# Patient Record
Sex: MALE | Race: WHITE | NOT HISPANIC OR LATINO | URBAN - METROPOLITAN AREA
[De-identification: names, ages, dates, MRNs, and addresses within clinical notes are randomized per-mention and may not be internally consistent; named-entity substitution may affect disease eponyms.]

---

## 2024-11-11 ENCOUNTER — HOSPITAL ENCOUNTER (OUTPATIENT)
Dept: CARDIOLOGY | Facility: HOSPITAL | Age: 64
Discharge: HOME | End: 2024-11-11
Payer: COMMERCIAL

## 2024-11-19 ENCOUNTER — HOSPITAL ENCOUNTER (OUTPATIENT)
Dept: CARDIOLOGY | Facility: HOSPITAL | Age: 64
Discharge: HOME | End: 2024-11-19
Payer: COMMERCIAL

## 2024-12-02 DIAGNOSIS — I34.0 MITRAL VALVE INSUFFICIENCY, UNSPECIFIED ETIOLOGY: Primary | ICD-10-CM

## 2024-12-19 PROBLEM — I10 HYPERTENSION: Status: ACTIVE | Noted: 2024-12-19

## 2024-12-19 PROBLEM — N20.0 KIDNEY STONE: Status: RESOLVED | Noted: 2023-10-17 | Resolved: 2024-12-19

## 2024-12-19 RX ORDER — LISINOPRIL 20 MG/1
20 TABLET ORAL DAILY
COMMUNITY
Start: 2024-10-09

## 2024-12-19 RX ORDER — ONDANSETRON 4 MG/1
4 TABLET, ORALLY DISINTEGRATING ORAL EVERY 8 HOURS PRN
COMMUNITY
Start: 2024-03-25 | End: 2024-12-20 | Stop reason: ALTCHOICE

## 2024-12-19 RX ORDER — PREGABALIN 100 MG/1
1 CAPSULE ORAL
COMMUNITY
Start: 2024-05-24 | End: 2024-12-20 | Stop reason: ALTCHOICE

## 2024-12-19 NOTE — PROGRESS NOTES
Referral from Dr. Dang. Rigo comes to discuss treatment options for mitral regurgitation. History of htn, kidney stones. Lindsay Jean RN    Patient comes to clinic to discuss treatment options for mitral regurgitation.  Echocardiography demonstrates severe mitral regurgitation secondary to flail of segment of posterior mitral valve leaflet.  Coronary angiography demonstrates normal coronary arteries without any significant atherosclerosis.  Recommend mini right thoracotomy and repair of mitral valve.  Will plan on right common femoral artery and vein cannulation as well as intercostal cryoanalgesia.  If unable to repair valve we will plan on using a tissue valve.  Risks, benefits, and alternatives discussed with patient, who wishes to proceed.  I have reviewed his echocardiogram and coronary angiogram.

## 2024-12-20 ENCOUNTER — OFFICE VISIT (OUTPATIENT)
Dept: CARDIAC SURGERY | Facility: HOSPITAL | Age: 64
End: 2024-12-20
Payer: COMMERCIAL

## 2024-12-20 VITALS
HEIGHT: 68 IN | DIASTOLIC BLOOD PRESSURE: 92 MMHG | HEART RATE: 79 BPM | SYSTOLIC BLOOD PRESSURE: 160 MMHG | WEIGHT: 212.6 LBS | OXYGEN SATURATION: 95 % | BODY MASS INDEX: 32.22 KG/M2

## 2024-12-20 DIAGNOSIS — I34.0 MITRAL VALVE INSUFFICIENCY, UNSPECIFIED ETIOLOGY: ICD-10-CM

## 2024-12-20 PROCEDURE — 3077F SYST BP >= 140 MM HG: CPT | Performed by: THORACIC SURGERY (CARDIOTHORACIC VASCULAR SURGERY)

## 2024-12-20 PROCEDURE — 99205 OFFICE O/P NEW HI 60 MIN: CPT | Performed by: THORACIC SURGERY (CARDIOTHORACIC VASCULAR SURGERY)

## 2024-12-20 PROCEDURE — 3080F DIAST BP >= 90 MM HG: CPT | Performed by: THORACIC SURGERY (CARDIOTHORACIC VASCULAR SURGERY)

## 2024-12-20 PROCEDURE — 99215 OFFICE O/P EST HI 40 MIN: CPT | Performed by: THORACIC SURGERY (CARDIOTHORACIC VASCULAR SURGERY)

## 2024-12-20 PROCEDURE — 1036F TOBACCO NON-USER: CPT | Performed by: THORACIC SURGERY (CARDIOTHORACIC VASCULAR SURGERY)

## 2024-12-20 PROCEDURE — 3008F BODY MASS INDEX DOCD: CPT | Performed by: THORACIC SURGERY (CARDIOTHORACIC VASCULAR SURGERY)

## 2024-12-20 RX ORDER — IBUPROFEN 100 MG/5ML
1000 SUSPENSION, ORAL (FINAL DOSE FORM) ORAL DAILY
COMMUNITY

## 2024-12-20 ASSESSMENT — PAIN SCALES - GENERAL: PAINLEVEL_OUTOF10: 0-NO PAIN

## 2025-01-09 DIAGNOSIS — I34.0 MITRAL VALVE INSUFFICIENCY, UNSPECIFIED ETIOLOGY: ICD-10-CM

## 2025-01-28 ENCOUNTER — CLINICAL SUPPORT (OUTPATIENT)
Dept: PREADMISSION TESTING | Facility: HOSPITAL | Age: 65
End: 2025-01-28
Payer: COMMERCIAL

## 2025-01-28 ASSESSMENT — ENCOUNTER SYMPTOMS
NEUROLOGICAL NEGATIVE: 1
CONSTITUTIONAL NEGATIVE: 1
CARDIOVASCULAR NEGATIVE: 1
COUGH: 1
GASTROINTESTINAL NEGATIVE: 1
NECK NEGATIVE: 1
MUSCULOSKELETAL NEGATIVE: 1

## 2025-01-28 NOTE — CPM/PAT H&P
CPM/PAT Evaluation       Name: Rigo Monet (Rigo Monet)  /Age: 1960/64 y.o.     { PAT Visit Type:35256}      Chief Complaint: ***    HPI  Rigo Monet is scheduled for repair, mitral valve- right on 25 with Dr. Valdes.   Past Medical History:   Diagnosis Date    Hypertension     Kidney stone 10/17/2023    Nephrolithiasis     Severe mitral regurgitation     Viral URI 2025       No past surgical history on file.    Patient  has no history on file for sexual activity.    No family history on file.    No Known Allergies    Prior to Admission medications    Medication Sig Start Date End Date Taking? Authorizing Provider   ascorbic acid (Vitamin C) 1,000 mg tablet Take 1 tablet (1,000 mg) by mouth once daily.    Historical Provider, MD   lisinopril 20 mg tablet Take 1 tablet (20 mg) by mouth once daily. 10/9/24   Historical Provider, MD JOHNSON ROS:   Constitutional:   neg    Neuro/Psych:   neg    Eyes:    use of corrective lenses  Ears:   neg    Nose:   neg    Mouth:   neg    Throat:   neg    Neck:   neg    Cardio:   neg    Respiratory:    cough (dry , np)  Endocrine:   GI:   neg    :   neg    Musculoskeletal:   neg    Hematologic:   neg    Skin:  neg        PAT Physical Exam     Airway    Testing/Diagnostic:   Cardiac Cath: 10/30/24      Transesophageal Echo; 10/15/24    ECG; 10/30/24- Media tab  Sinus Rhythm    Patient Specialist/PCP:   PCP: Nehemiah Dukes MD P: [phone]    Cardiac Surgery: Denilson Valdes MD LOV 24 Referral from Dr. Dang. Rigo comes to discuss treatment options for mitral regurgitation. Recommend mini right thoracotomy and repair of mitral valve. Will plan on right common femoral artery and vein cannulation as well as intercostal cryoanalgesia. If unable to repair valve we will plan on using a tissue valve.     Cardiology: MD Kenyetta Smith 10/9/24 NPV for abnormal echo with concern for significant murmur.    Urology: Tae Rodgers MD seen for kidney  stones  Visit Vitals  Smoking Status Never       DASI Risk Score      Flowsheet Row Questionnaire Series Submission from 1/3/2025 in [facility] with Generic Provider Keira   Can you take care of yourself (eat, dress, bathe, or use toilet)?  2.75  filed at 01/03/2025 1654   Can you walk indoors, such as around your house? 1.75  filed at 01/03/2025 1654   Can you walk a block or two on level ground?  2.75  filed at 01/03/2025 1654   Can you climb a flight of stairs or walk up a hill? 5.5  filed at 01/03/2025 1654   Can you run a short distance? 8  filed at 01/03/2025 1654   Can you do light work around the house like dusting or washing dishes? 2.7  filed at 01/03/2025 1654   Can you do moderate work around the house like vacuuming, sweeping floors or carrying groceries? 3.5  filed at 01/03/2025 1654   Can you do heavy work around the house like scrubbing floors or lifting and moving heavy furniture?  8  filed at 01/03/2025 1654   Can you do yard work like raking leaves, weeding or pushing a mower? 4.5  filed at 01/03/2025 1654   Can you have sexual relations? 5.25  filed at 01/03/2025 1654   Can you participate in moderate recreational activities like golf, bowling, dancing, doubles tennis or throwing a baseball or football? 6  filed at 01/03/2025 1654   Can you participate in strenous sports like swimming, singles tennis, football, basketball, or skiing? 7.5  filed at 01/03/2025 1654   DASI SCORE 58.2  filed at 01/03/2025 1654   METS Score (Will be calculated only when all the questions are answered) 9.9  filed at 01/03/2025 1654          Caprini DVT Assessment    No data to display       Modified Frailty Index    No data to display       CHADS2 Stroke Risk  Current as of yesterday        N/A 3 to 100%: High Risk   2 to < 3%: Medium Risk   0 to < 2%: Low Risk     Last Change: N/A          This score determines the patient's risk of having a stroke if the patient has atrial fibrillation.        This score is not  applicable to this patient. Components are not calculated.          Revised Cardiac Risk Index    No data to display       Apfel Simplified Score    No data to display       Risk Analysis Index Results This Encounter    No data found in the last 10 encounters.       Stop Bang Score      Flowsheet Row Questionnaire Series Submission from 1/3/2025 in [facility] with Generic Provider Keira   Do you snore loudly? 0  filed at 01/03/2025 1654   Do you often feel tired or fatigued after your sleep? 0  filed at 01/03/2025 1654   Has anyone ever observed you stop breathing in your sleep? 0  filed at 01/03/2025 1654   Do you have or are you being treated for high blood pressure? 1  filed at 01/03/2025 1654   Recent BMI (Calculated) 32.3  filed at 01/03/2025 1654   Is BMI greater than 35 kg/m2? 0=No  filed at 01/03/2025 1654   Age older than 50 years old? 1=Yes  filed at 01/03/2025 1654   Gender - Male 1=Yes  filed at 01/03/2025 1654          Prodigy: High Risk  Total Score: 16              Prodigy Age Score      Prodigy Gender Score          ARISCAT Score for Postoperative Pulmonary Complications    No data to display       Lopez Perioperative Risk for Myocardial Infarction or Cardiac Arrest (BRADEN)    No data to display         Assessment and Plan:   Telephone call completed with patient. Medication Instructions: Instructed to hold vitamins, supplements, and NSAIDs 7 days prior to surgery.  Deb Gramajo RN  Pre Admission Testing     Deb Gramajo RN  Pre-Admission Testing   {[facility] EMBEDDED ASSESSMENT AND PLAN:[id number]}

## 2025-02-03 ENCOUNTER — HOSPITAL ENCOUNTER (OUTPATIENT)
Dept: RADIOLOGY | Facility: HOSPITAL | Age: 65
Discharge: HOME | End: 2025-02-03
Payer: COMMERCIAL

## 2025-02-03 ENCOUNTER — PRE-ADMISSION TESTING (OUTPATIENT)
Dept: PREADMISSION TESTING | Facility: HOSPITAL | Age: 65
End: 2025-02-03
Payer: COMMERCIAL

## 2025-02-03 VITALS
HEIGHT: 68 IN | BODY MASS INDEX: 32.12 KG/M2 | SYSTOLIC BLOOD PRESSURE: 170 MMHG | OXYGEN SATURATION: 98 % | TEMPERATURE: 97.9 F | DIASTOLIC BLOOD PRESSURE: 100 MMHG | HEART RATE: 64 BPM | WEIGHT: 211.9 LBS

## 2025-02-03 DIAGNOSIS — I34.0 MITRAL VALVE INSUFFICIENCY, UNSPECIFIED ETIOLOGY: ICD-10-CM

## 2025-02-03 DIAGNOSIS — I34.0 MITRAL VALVE INSUFFICIENCY, UNSPECIFIED ETIOLOGY: Primary | ICD-10-CM

## 2025-02-03 PROCEDURE — 71046 X-RAY EXAM CHEST 2 VIEWS: CPT

## 2025-02-03 PROCEDURE — 85025 COMPLETE CBC W/AUTO DIFF WBC: CPT

## 2025-02-03 PROCEDURE — 86901 BLOOD TYPING SEROLOGIC RH(D): CPT

## 2025-02-03 PROCEDURE — 86923 COMPATIBILITY TEST ELECTRIC: CPT

## 2025-02-03 PROCEDURE — 87081 CULTURE SCREEN ONLY: CPT

## 2025-02-03 PROCEDURE — 36415 COLL VENOUS BLD VENIPUNCTURE: CPT

## 2025-02-03 PROCEDURE — 99204 OFFICE O/P NEW MOD 45 MIN: CPT | Performed by: NURSE PRACTITIONER

## 2025-02-03 PROCEDURE — 84520 ASSAY OF UREA NITROGEN: CPT

## 2025-02-03 RX ORDER — CHLORHEXIDINE GLUCONATE ORAL RINSE 1.2 MG/ML
SOLUTION DENTAL
Qty: 15 ML | Refills: 0 | Status: SHIPPED | OUTPATIENT
Start: 2025-02-03

## 2025-02-03 RX ORDER — CHLORHEXIDINE GLUCONATE 40 MG/ML
SOLUTION TOPICAL
Qty: 473 ML | Refills: 0 | Status: SHIPPED | OUTPATIENT
Start: 2025-02-03

## 2025-02-03 ASSESSMENT — DUKE ACTIVITY SCORE INDEX (DASI)
CAN YOU DO LIGHT WORK AROUND THE HOUSE LIKE DUSTING OR WASHING DISHES: YES
CAN YOU PARTICIPATE IN MODERATE RECREATIONAL ACTIVITIES LIKE GOLF, BOWLING, DANCING, DOUBLES TENNIS OR THROWING A BASEBALL OR FOOTBALL: YES
CAN YOU HAVE SEXUAL RELATIONS: YES
CAN YOU TAKE CARE OF YOURSELF (EAT, DRESS, BATHE, OR USE TOILET): YES
DASI METS SCORE: 9.9
CAN YOU PARTICIPATE IN STRENOUS SPORTS LIKE SWIMMING, SINGLES TENNIS, FOOTBALL, BASKETBALL, OR SKIING: YES
CAN YOU RUN A SHORT DISTANCE: YES
CAN YOU WALK A BLOCK OR TWO ON LEVEL GROUND: YES
CAN YOU DO YARD WORK LIKE RAKING LEAVES, WEEDING OR PUSHING A MOWER: YES
CAN YOU DO MODERATE WORK AROUND THE HOUSE LIKE VACUUMING, SWEEPING FLOORS OR CARRYING GROCERIES: YES
CAN YOU DO HEAVY WORK AROUND THE HOUSE LIKE SCRUBBING FLOORS OR LIFTING AND MOVING HEAVY FURNITURE: YES
CAN YOU CLIMB A FLIGHT OF STAIRS OR WALK UP A HILL: YES
TOTAL_SCORE: 58.2
CAN YOU WALK INDOORS, SUCH AS AROUND YOUR HOUSE: YES

## 2025-02-03 ASSESSMENT — ENCOUNTER SYMPTOMS
PALPITATIONS: 1
COUGH: 1
GASTROINTESTINAL NEGATIVE: 1
NEUROLOGICAL NEGATIVE: 1
EYES NEGATIVE: 1
ENDOCRINE NEGATIVE: 1
NECK NEGATIVE: 1
MUSCULOSKELETAL NEGATIVE: 1
CONSTITUTIONAL NEGATIVE: 1

## 2025-02-03 ASSESSMENT — LIFESTYLE VARIABLES: SMOKING_STATUS: NONSMOKER

## 2025-02-03 NOTE — H&P (VIEW-ONLY)
CPM/PAT Evaluation       Name: Rigo Monet (Rigo Monet)  /Age: 1960/64 y.o.     Visit Type:   In-Person       Chief Complaint: perioperative evaluation      HPI  The patient is a 64 year old male with recent echo demonstrating severe mitral regurgitation secondary to flail of segment of posterior mitral valve leaflet. Coronary angiography demonstrates normal coronary arteries without any significant atherosclerosis. Recommend mini right thoracotomy and repair of mitral valve. She presents today for perioperative evaluation in anticipation of REPAIR, MITRAL VALVE - Right on 25 with Dr. Valdes.    Past Medical History:   Diagnosis Date    Basal cell carcinoma     Hiatal hernia     Hypertension     Kidney stone 10/17/2023    follows with urology    Nephrolithiasis     Severe mitral regurgitation     Viral URI 2025    patient states that he has a dry, NP cough the last 5 weeks       Past Surgical History:   Procedure Laterality Date    CARDIAC CATHETERIZATION  10/30/2024    COLONOSCOPY      KIDNEY STONE SURGERY         Patient Sexual activity questions deferred to the physician.    No family history on file.    No Known Allergies    Prior to Admission medications    Medication Sig Start Date End Date Taking? Authorizing Provider   ascorbic acid (Vitamin C) 1,000 mg tablet Take 1 tablet (1,000 mg) by mouth once daily.    Historical Provider, MD   lisinopril 20 mg tablet Take 1 tablet (20 mg) by mouth once daily. 10/9/24   Historical Provider, MD JOHNSON ROS:   Constitutional:   neg    Neuro/Psych:   neg    Eyes:   neg     use of corrective lenses  Ears:   neg    Nose:   neg    Mouth:   neg    Throat:   neg    Neck:   neg    Cardio:    chest pain (dull, intermittent)   palpitations  Respiratory:    cough (dry, intermittent unchanged)  Endocrine:   neg    GI:   neg    :   neg    Musculoskeletal:   neg    Hematologic:   neg    Skin:  neg        Physical Exam  Vitals reviewed.   Constitutional:   "     Appearance: Normal appearance.   HENT:      Head: Normocephalic and atraumatic.      Nose: Nose normal.      Mouth/Throat:      Mouth: Mucous membranes are moist.      Pharynx: Oropharynx is clear.   Eyes:      Extraocular Movements: Extraocular movements intact.      Pupils: Pupils are equal, round, and reactive to light.   Cardiovascular:      Rate and Rhythm: Normal rate and regular rhythm.      Pulses: Normal pulses.      Heart sounds: Normal heart sounds.   Pulmonary:      Effort: Pulmonary effort is normal.      Breath sounds: Normal breath sounds.   Musculoskeletal:         General: Normal range of motion.      Cervical back: Normal range of motion.   Skin:     General: Skin is warm and dry.   Neurological:      General: No focal deficit present.      Mental Status: He is alert and oriented to person, place, and time.   Psychiatric:         Mood and Affect: Mood normal.         Behavior: Behavior normal.          PAT AIRWAY:   Airway:     Mallampati::  II    TM distance::  >3 FB    Neck ROM::  Full  normal          Visit Vitals  BP (!) 170/100   Pulse 64   Temp 36.6 °C (97.9 °F)   Ht 1.727 m (5' 8\")   Wt 96.1 kg (211 lb 14.4 oz)   SpO2 98%   BMI 32.22 kg/m²   Smoking Status Never   BSA 2.15 m²       DASI Risk Score      Flowsheet Row Pre-Admission Testing from 2/3/2025 in [facility] Questionnaire Series Submission from 1/3/2025 in [facility] with Generic Provider Keira   Can you take care of yourself (eat, dress, bathe, or use toilet)?  2.75 filed at 02/03/2025 1518 2.75  filed at 01/03/2025 1654   Can you walk indoors, such as around your house? 1.75 filed at 02/03/2025 1518 1.75  filed at 01/03/2025 1654   Can you walk a block or two on level ground?  2.75 filed at 02/03/2025 1518 2.75  filed at 01/03/2025 1654   Can you climb a flight of stairs or walk up a hill? 5.5 filed at 02/03/2025 1518 5.5  filed at 01/03/2025 1654   Can you run a short distance? 8 filed at 02/03/2025 1518 8 "  filed at 01/03/2025 1654   Can you do light work around the house like dusting or washing dishes? 2.7 filed at 02/03/2025 1518 2.7  filed at 01/03/2025 1654   Can you do moderate work around the house like vacuuming, sweeping floors or carrying groceries? 3.5 filed at 02/03/2025 1518 3.5  filed at 01/03/2025 1654   Can you do heavy work around the house like scrubbing floors or lifting and moving heavy furniture?  8 filed at 02/03/2025 1518 8  filed at 01/03/2025 1654   Can you do yard work like raking leaves, weeding or pushing a mower? 4.5 filed at 02/03/2025 1518 4.5  filed at 01/03/2025 1654   Can you have sexual relations? 5.25 filed at 02/03/2025 1518 5.25  filed at 01/03/2025 1654   Can you participate in moderate recreational activities like golf, bowling, dancing, doubles tennis or throwing a baseball or football? 6 filed at 02/03/2025 1518 6  filed at 01/03/2025 1654   Can you participate in strenous sports like swimming, singles tennis, football, basketball, or skiing? 7.5 filed at 02/03/2025 1518 7.5  filed at 01/03/2025 1654   DASI SCORE 58.2 filed at 02/03/2025 1518 58.2  filed at 01/03/2025 1654   METS Score (Will be calculated only when all the questions are answered) 9.9 filed at 02/03/2025 1518 9.9  filed at 01/03/2025 1654          Caprini DVT Assessment      Flowsheet Row Pre-Admission Testing from 2/3/2025 in [facility]   DVT Score (IF A SCORE IS NOT CALCULATING, MUST SELECT A BMI TO COMPLETE) 10 filed at 02/03/2025 1515   Surgical Factors Major surgery planned, lasting over 3 hours filed at 02/03/2025 1515   BMI (BMI MUST BE CHOSEN) 31-40 (Obesity) filed at 02/03/2025 1515          Modified Frailty Index      Flowsheet Row Pre-Admission Testing from 2/3/2025 in [facility]   Non-independent functional status (problems with dressing, bathing, personal grooming, or cooking) 0 filed at 02/03/2025 1514   History of diabetes mellitus  0 filed at 02/03/2025 1512    History of COPD 0 filed at 02/03/2025 1514   History of CHF No filed at 02/03/2025 1514   History of MI 0 filed at 02/03/2025 1514   History of Percutaneous Coronary Intervention, Cardiac Surgery, or Angina No filed at 02/03/2025 1514   Hypertension requiring the use of medication  0.0909 filed at 02/03/2025 1514   Peripheral vascular disease 0 filed at 02/03/2025 1514   Impaired sensorium (cognitive impairement or loss, clouding, or delirium) 0 filed at 02/03/2025 1514   TIA or CVA withouy residual deficit 0 filed at 02/03/2025 1514   Cerebrovascular accident with deficit 0 filed at 02/03/2025 1514   Modified Frailty Index Calculator .0909 filed at 02/03/2025 1514          CHADS2 Stroke Risk  Current as of 2 hours ago        N/A 3 to 100%: High Risk   2 to < 3%: Medium Risk   0 to < 2%: Low Risk     Last Change: N/A          This score determines the patient's risk of having a stroke if the patient has atrial fibrillation.        This score is not applicable to this patient. Components are not calculated.          Revised Cardiac Risk Index      Flowsheet Row Pre-Admission Testing from 2/3/2025 in [facility]   High-Risk Surgery (Intraperitoneal, Intrathoracic,Suprainguinal vascular) 1 filed at 02/03/2025 1514   History of ischemic heart disease (History of MI, History of positive exercuse test, Current chest paint considered due to myocardial ischemia, Use of nitrate therapy, ECG with pathological Q Waves) 0 filed at 02/03/2025 1514   History of congestive heart failure (pulmonary edemia, bilateral rales or S3 gallop, Paroxysmal nocturnal dyspnea, CXR showing pulmonary vascular redistribution) 0 filed at 02/03/2025 1514   History of cerebrovascular disease (Prior TIA or stroke) 0 filed at 02/03/2025 1514   Pre-operative insulin treatment 0 filed at 02/03/2025 1514   Pre-operative creatinine>2 mg/dl 0 filed at 02/03/2025 1514   Revised Cardiac Risk Calculator 1 filed at 02/03/2025 1514           Apfel Simplified Score      Flowsheet Row Pre-Admission Testing from 2/3/2025 in [facility]   Smoking status 1 filed at 02/03/2025 1515   History of motion sickness or PONV  0 filed at 02/03/2025 1515   Use of postoperative opioids 1 filed at 02/03/2025 1515   Gender - Female 0=No filed at 02/03/2025 1515   Apfel Simplified Score Calculator 2 filed at 02/03/2025 1515          Risk Analysis Index Results This Encounter    No data found in the last 10 encounters.       Stop Bang Score      Flowsheet Row Pre-Admission Testing from 2/3/2025 in [facility] Questionnaire Series Submission from 1/3/2025 in [facility] with Generic Provider Keira   Do you snore loudly? 1 filed at 02/03/2025 1518 0  filed at 01/03/2025 1654   Do you often feel tired or fatigued after your sleep? 1 filed at 02/03/2025 1518 0  filed at 01/03/2025 1654   Has anyone ever observed you stop breathing in your sleep? 0 filed at 02/03/2025 1518 0  filed at 01/03/2025 1654   Do you have or are you being treated for high blood pressure? 1 filed at 02/03/2025 1518 1  filed at 01/03/2025 1654   Recent BMI (Calculated) 32.3 filed at 02/03/2025 1518 32.3  filed at 01/03/2025 1654   Is BMI greater than 35 kg/m2? 0=No filed at 02/03/2025 1518 0=No  filed at 01/03/2025 1654   Age older than 50 years old? 1=Yes filed at 02/03/2025 1518 1=Yes  filed at 01/03/2025 1654   Is your neck circumference greater than 17 inches (Male) or 16 inches (Female)? 0 filed at 02/03/2025 1518 --   Gender - Male 1=Yes filed at 02/03/2025 1518 1=Yes  filed at 01/03/2025 1654   STOP-BANG Total Score 5 filed at 02/03/2025 1518 --          Prodigy: High Risk  Total Score: 16              Prodigy Age Score      Prodigy Gender Score          ARISCAT Score for Postoperative Pulmonary Complications    No data to display       Lopez Perioperative Risk for Myocardial Infarction or Cardiac Arrest (BRADEN)    No data to display       Recent Results (from  the past 4 weeks)   Comprehensive Metabolic Panel    Collection Time: 02/03/25  3:38 PM   Result Value Ref Range    Glucose      Sodium 139 136 - 145 mmol/L    Potassium      Chloride 103 98 - 107 mmol/L    Bicarbonate 26 21 - 32 mmol/L    Anion Gap      Urea Nitrogen 14 6 - 23 mg/dL    Creatinine 0.99 0.50 - 1.30 mg/dL    eGFR 85 >60 mL/min/1.73m*2    Calcium 10.0 8.6 - 10.6 mg/dL    Albumin 4.3 3.4 - 5.0 g/dL    Alkaline Phosphatase 77 33 - 136 U/L    Total Protein 7.8 6.4 - 8.2 g/dL    AST 14 9 - 39 U/L    Bilirubin, Total 0.8 0.0 - 1.2 mg/dL    ALT 10 10 - 52 U/L   CBC and Auto Differential    Collection Time: 02/03/25  3:38 PM   Result Value Ref Range    WBC 6.3 4.4 - 11.3 x10*3/uL    nRBC 0.0 0.0 - 0.0 /100 WBCs    RBC 5.05 4.50 - 5.90 x10*6/uL    Hemoglobin 14.8 13.5 - 17.5 g/dL    Hematocrit 46.7 41.0 - 52.0 %    MCV 93 80 - 100 fL    MCH 29.3 26.0 - 34.0 pg    MCHC 31.7 (L) 32.0 - 36.0 g/dL    RDW 13.7 11.5 - 14.5 %    Platelets 174 150 - 450 x10*3/uL    Neutrophils % 55.3 40.0 - 80.0 %    Immature Granulocytes %, Automated 0.3 0.0 - 0.9 %    Lymphocytes % 34.4 13.0 - 44.0 %    Monocytes % 6.8 2.0 - 10.0 %    Eosinophils % 2.9 0.0 - 6.0 %    Basophils % 0.3 0.0 - 2.0 %    Neutrophils Absolute 3.48 1.20 - 7.70 x10*3/uL    Immature Granulocytes Absolute, Automated 0.02 0.00 - 0.70 x10*3/uL    Lymphocytes Absolute 2.17 1.20 - 4.80 x10*3/uL    Monocytes Absolute 0.43 0.10 - 1.00 x10*3/uL    Eosinophils Absolute 0.18 0.00 - 0.70 x10*3/uL    Basophils Absolute 0.02 0.00 - 0.10 x10*3/uL   Type And Screen    Collection Time: 02/03/25  3:38 PM   Result Value Ref Range    ABO TYPE O     Rh TYPE POS     ANTIBODY SCREEN NEG    MRSA pending    Diagnostic Results   CXRAY 2/4/25  IMPRESSION:  No radiographic evidence of acute cardiopulmonary process.    ECG: 10/30/24 see media tab  Sinus rhythm     Cardiac Cath 10/30/24  Summary:  Condition 1-PA wedge pressure=26/25 (20) mm Hg. LV presssure=92/30, 12 mm Hg. dP/ye=1614 mm  Hg/s  Mitral valve: There is severe regurgitation  Left ventricle: Systolic function is normal  Impressions:  No significant coronary artery disease is identified  Severe MR.    Transesophageal echo 10/15/24      Assessment and Plan:     Anesthesia  The patient denies problems with anesthesia in the past such as PONV, prolonged sedation, awareness, dental damage, aspiration, cardiac arrest, difficult intubation, or unexpected hospital admissions.     Neurology  The patient has no neurological diagnoses or significant findings on chart review, clinical presentation, and evaluation. No grossly apparent neurological perioperative risk. The patient is at increased risk for perioperative stroke secondary to cardiac disease, hypertension , general anesthesia, operative time >2.5 hours. Handouts for preoperative brain exercises given to patient.    HEENT/Airway  No diagnoses, significant findings on chart review, clinical presentation, or evaluation. No documented or reported history of airway difficulty.     Cardiovascular  #HTN  -managed on lisinopril, instructed to hold 24 hours prior to surgery. BP today 170/100.    #Mitral regurgitation  -severe by last echo, scheduled for surgery with Dr. Valdes on 2/13/25    METS  The patient's functional capacity is greater than 4 METS.  RCRI  The patient meets 1 RCRI criteria and therefore has a 6% risk of major adverse cardiac complications.  BRADEN score which indicates a 0.2% risk of intraoperative or 30-day postoperative MACE (major adverse cardiac event).    Cardiology Evaluation  The patient follows with cardiology, MD Kenyetta Smith 10/9/24 NPV for abnormal echo with concern for significant murmur.    He is scheduled for cardiac surgery.  Preoperative evaluation is complete pending results of labs, urine, nares.    Pulmonary  No significant findings on chart review or clinical presentation and evaluation. The patient is at increased risk of perioperative pulmonary  complications secondary to thoracic surgery, advanced age greater than 60.    STOP BANG 5, which places patient at high risk for having DOMINICK.    Recommend prioritizing non opioid analgesic techniques (regional and local anesthesia, nonsteroidal medications, etc) before the administration of opioids and close monitoring for hypoventilation after surgery due to DOMINICK/supsected DOMINICK. If intravenous narcotics are needed beyond the immediate indio-operative period, the patient may benefit from continuous pulse oximetry to monitor for hypoxic events till baseline Sp02 is normal on room air and  a respiratory therapy evaluation.    ARISCAT 50, High, 42.1% risk of in-hospital postoperative pulmonary complications  PRODIGY 16, high risk of respiratory depression episode.     Patient given PI sheet for preoperative deep breathing exercises.  Encourage  incentive spirometry in the postoperative period as deemed necessary.    Endocrine  No diagnoses or significant findings on chart review or clinical presentation and evaluation.    Gastrointestinal  The patient has HH, currently asymptomatic. No acute GI issues.    Eat 10- 0,  self-perceived oropharyngeal dysphagia scale (0-40)     Genitourinary  The patient has history of kidney stones s/p lithotripsy, followed by urology Dr. Duc Rodgers, LOV 10/17/23. Follow up prn. Reports no acute  complaints today.    Renal  No renal diagnoses or significant findings on chart review or clinical presentation and evaluation. The patient has specific risk factors associated with increased risk of perioperative renal complications related to age greater than 55, male gender, hypertension. Preventative measures include preoperative hydration.    Hematology  No diagnoses or significant findings on chart review or clinical presentation and evaluation.    Caprini score 10, high risk of perioperative VTE.     Patient instructed to ambulate as soon as possible postoperatively to decrease thromboembolic  risk. Initiate mechanical DVT prophylaxis as soon as possible and initiate chemical prophylaxis when deemed safe from a bleeding standpoint post surgery.     Transfusion Evaluation  A type and screen was obtained given the likelihood for perioperative transfusion of blood or blood products.    Musculoskeletal  No diagnoses or significant findings on chart review or clinical presentation and evaluation.    ID  No diagnoses or significant findings on chart review or clinical presentation and evaluation. MRSA screening obtained. Prescriptions and instructions given for Hibiclens and Peridex.    -Preoperative medication instructions were provided and reviewed with the patient.  Any additional testing or evaluation was explained to the patient.  NPO Instructions were discussed, and the patient's questions were answered prior to conclusion of this encounter. Patient verbalized understanding of preoperative instructions. After Visit Summary given.

## 2025-02-03 NOTE — CPM/PAT H&P
CPM/PAT Evaluation       Name: Rigo Monet (Rigo Monet)  /Age: 1960/64 y.o.     Visit Type:   In-Person       Chief Complaint: perioperative evaluation      HPI  The patient is a 64 year old male with recent echo demonstrating severe mitral regurgitation secondary to flail of segment of posterior mitral valve leaflet. Coronary angiography demonstrates normal coronary arteries without any significant atherosclerosis. Recommend mini right thoracotomy and repair of mitral valve. She presents today for perioperative evaluation in anticipation of REPAIR, MITRAL VALVE - Right on 25 with Dr. Valdes.    Past Medical History:   Diagnosis Date    Basal cell carcinoma     Hiatal hernia     Hypertension     Kidney stone 10/17/2023    follows with urology    Nephrolithiasis     Severe mitral regurgitation     Viral URI 2025    patient states that he has a dry, NP cough the last 5 weeks       Past Surgical History:   Procedure Laterality Date    CARDIAC CATHETERIZATION  10/30/2024    COLONOSCOPY      KIDNEY STONE SURGERY         Patient Sexual activity questions deferred to the physician.    No family history on file.    No Known Allergies    Prior to Admission medications    Medication Sig Start Date End Date Taking? Authorizing Provider   ascorbic acid (Vitamin C) 1,000 mg tablet Take 1 tablet (1,000 mg) by mouth once daily.    Historical Provider, MD   lisinopril 20 mg tablet Take 1 tablet (20 mg) by mouth once daily. 10/9/24   Historical Provider, MD JOHNSON ROS:   Constitutional:   neg    Neuro/Psych:   neg    Eyes:   neg     use of corrective lenses  Ears:   neg    Nose:   neg    Mouth:   neg    Throat:   neg    Neck:   neg    Cardio:    chest pain (dull, intermittent)   palpitations  Respiratory:    cough (dry, intermittent unchanged)  Endocrine:   neg    GI:   neg    :   neg    Musculoskeletal:   neg    Hematologic:   neg    Skin:  neg        Physical Exam  Vitals reviewed.   Constitutional:   "     Appearance: Normal appearance.   HENT:      Head: Normocephalic and atraumatic.      Nose: Nose normal.      Mouth/Throat:      Mouth: Mucous membranes are moist.      Pharynx: Oropharynx is clear.   Eyes:      Extraocular Movements: Extraocular movements intact.      Pupils: Pupils are equal, round, and reactive to light.   Cardiovascular:      Rate and Rhythm: Normal rate and regular rhythm.      Pulses: Normal pulses.      Heart sounds: Normal heart sounds.   Pulmonary:      Effort: Pulmonary effort is normal.      Breath sounds: Normal breath sounds.   Musculoskeletal:         General: Normal range of motion.      Cervical back: Normal range of motion.   Skin:     General: Skin is warm and dry.   Neurological:      General: No focal deficit present.      Mental Status: He is alert and oriented to person, place, and time.   Psychiatric:         Mood and Affect: Mood normal.         Behavior: Behavior normal.          PAT AIRWAY:   Airway:     Mallampati::  II    TM distance::  >3 FB    Neck ROM::  Full  normal          Visit Vitals  BP (!) 170/100   Pulse 64   Temp 36.6 °C (97.9 °F)   Ht 1.727 m (5' 8\")   Wt 96.1 kg (211 lb 14.4 oz)   SpO2 98%   BMI 32.22 kg/m²   Smoking Status Never   BSA 2.15 m²       DASI Risk Score      Flowsheet Row Pre-Admission Testing from 2/3/2025 in [facility] Questionnaire Series Submission from 1/3/2025 in [facility] with Generic Provider Keira   Can you take care of yourself (eat, dress, bathe, or use toilet)?  2.75 filed at 02/03/2025 1518 2.75  filed at 01/03/2025 1654   Can you walk indoors, such as around your house? 1.75 filed at 02/03/2025 1518 1.75  filed at 01/03/2025 1654   Can you walk a block or two on level ground?  2.75 filed at 02/03/2025 1518 2.75  filed at 01/03/2025 1654   Can you climb a flight of stairs or walk up a hill? 5.5 filed at 02/03/2025 1518 5.5  filed at 01/03/2025 1654   Can you run a short distance? 8 filed at 02/03/2025 1518 8 "  filed at 01/03/2025 1654   Can you do light work around the house like dusting or washing dishes? 2.7 filed at 02/03/2025 1518 2.7  filed at 01/03/2025 1654   Can you do moderate work around the house like vacuuming, sweeping floors or carrying groceries? 3.5 filed at 02/03/2025 1518 3.5  filed at 01/03/2025 1654   Can you do heavy work around the house like scrubbing floors or lifting and moving heavy furniture?  8 filed at 02/03/2025 1518 8  filed at 01/03/2025 1654   Can you do yard work like raking leaves, weeding or pushing a mower? 4.5 filed at 02/03/2025 1518 4.5  filed at 01/03/2025 1654   Can you have sexual relations? 5.25 filed at 02/03/2025 1518 5.25  filed at 01/03/2025 1654   Can you participate in moderate recreational activities like golf, bowling, dancing, doubles tennis or throwing a baseball or football? 6 filed at 02/03/2025 1518 6  filed at 01/03/2025 1654   Can you participate in strenous sports like swimming, singles tennis, football, basketball, or skiing? 7.5 filed at 02/03/2025 1518 7.5  filed at 01/03/2025 1654   DASI SCORE 58.2 filed at 02/03/2025 1518 58.2  filed at 01/03/2025 1654   METS Score (Will be calculated only when all the questions are answered) 9.9 filed at 02/03/2025 1518 9.9  filed at 01/03/2025 1654          Caprini DVT Assessment      Flowsheet Row Pre-Admission Testing from 2/3/2025 in [facility]   DVT Score (IF A SCORE IS NOT CALCULATING, MUST SELECT A BMI TO COMPLETE) 10 filed at 02/03/2025 1515   Surgical Factors Major surgery planned, lasting over 3 hours filed at 02/03/2025 1515   BMI (BMI MUST BE CHOSEN) 31-40 (Obesity) filed at 02/03/2025 1515          Modified Frailty Index      Flowsheet Row Pre-Admission Testing from 2/3/2025 in [facility]   Non-independent functional status (problems with dressing, bathing, personal grooming, or cooking) 0 filed at 02/03/2025 1514   History of diabetes mellitus  0 filed at 02/03/2025 1511    History of COPD 0 filed at 02/03/2025 1514   History of CHF No filed at 02/03/2025 1514   History of MI 0 filed at 02/03/2025 1514   History of Percutaneous Coronary Intervention, Cardiac Surgery, or Angina No filed at 02/03/2025 1514   Hypertension requiring the use of medication  0.0909 filed at 02/03/2025 1514   Peripheral vascular disease 0 filed at 02/03/2025 1514   Impaired sensorium (cognitive impairement or loss, clouding, or delirium) 0 filed at 02/03/2025 1514   TIA or CVA withouy residual deficit 0 filed at 02/03/2025 1514   Cerebrovascular accident with deficit 0 filed at 02/03/2025 1514   Modified Frailty Index Calculator .0909 filed at 02/03/2025 1514          CHADS2 Stroke Risk  Current as of 2 hours ago        N/A 3 to 100%: High Risk   2 to < 3%: Medium Risk   0 to < 2%: Low Risk     Last Change: N/A          This score determines the patient's risk of having a stroke if the patient has atrial fibrillation.        This score is not applicable to this patient. Components are not calculated.          Revised Cardiac Risk Index      Flowsheet Row Pre-Admission Testing from 2/3/2025 in [facility]   High-Risk Surgery (Intraperitoneal, Intrathoracic,Suprainguinal vascular) 1 filed at 02/03/2025 1514   History of ischemic heart disease (History of MI, History of positive exercuse test, Current chest paint considered due to myocardial ischemia, Use of nitrate therapy, ECG with pathological Q Waves) 0 filed at 02/03/2025 1514   History of congestive heart failure (pulmonary edemia, bilateral rales or S3 gallop, Paroxysmal nocturnal dyspnea, CXR showing pulmonary vascular redistribution) 0 filed at 02/03/2025 1514   History of cerebrovascular disease (Prior TIA or stroke) 0 filed at 02/03/2025 1514   Pre-operative insulin treatment 0 filed at 02/03/2025 1514   Pre-operative creatinine>2 mg/dl 0 filed at 02/03/2025 1514   Revised Cardiac Risk Calculator 1 filed at 02/03/2025 1514           Apfel Simplified Score      Flowsheet Row Pre-Admission Testing from 2/3/2025 in [facility]   Smoking status 1 filed at 02/03/2025 1515   History of motion sickness or PONV  0 filed at 02/03/2025 1515   Use of postoperative opioids 1 filed at 02/03/2025 1515   Gender - Female 0=No filed at 02/03/2025 1515   Apfel Simplified Score Calculator 2 filed at 02/03/2025 1515          Risk Analysis Index Results This Encounter    No data found in the last 10 encounters.       Stop Bang Score      Flowsheet Row Pre-Admission Testing from 2/3/2025 in [facility] Questionnaire Series Submission from 1/3/2025 in [facility] with Generic Provider Keira   Do you snore loudly? 1 filed at 02/03/2025 1518 0  filed at 01/03/2025 1654   Do you often feel tired or fatigued after your sleep? 1 filed at 02/03/2025 1518 0  filed at 01/03/2025 1654   Has anyone ever observed you stop breathing in your sleep? 0 filed at 02/03/2025 1518 0  filed at 01/03/2025 1654   Do you have or are you being treated for high blood pressure? 1 filed at 02/03/2025 1518 1  filed at 01/03/2025 1654   Recent BMI (Calculated) 32.3 filed at 02/03/2025 1518 32.3  filed at 01/03/2025 1654   Is BMI greater than 35 kg/m2? 0=No filed at 02/03/2025 1518 0=No  filed at 01/03/2025 1654   Age older than 50 years old? 1=Yes filed at 02/03/2025 1518 1=Yes  filed at 01/03/2025 1654   Is your neck circumference greater than 17 inches (Male) or 16 inches (Female)? 0 filed at 02/03/2025 1518 --   Gender - Male 1=Yes filed at 02/03/2025 1518 1=Yes  filed at 01/03/2025 1654   STOP-BANG Total Score 5 filed at 02/03/2025 1518 --          Prodigy: High Risk  Total Score: 16              Prodigy Age Score      Prodigy Gender Score          ARISCAT Score for Postoperative Pulmonary Complications    No data to display       Lopez Perioperative Risk for Myocardial Infarction or Cardiac Arrest (BRADEN)    No data to display       Recent Results (from  the past 4 weeks)   Comprehensive Metabolic Panel    Collection Time: 02/03/25  3:38 PM   Result Value Ref Range    Glucose      Sodium 139 136 - 145 mmol/L    Potassium      Chloride 103 98 - 107 mmol/L    Bicarbonate 26 21 - 32 mmol/L    Anion Gap      Urea Nitrogen 14 6 - 23 mg/dL    Creatinine 0.99 0.50 - 1.30 mg/dL    eGFR 85 >60 mL/min/1.73m*2    Calcium 10.0 8.6 - 10.6 mg/dL    Albumin 4.3 3.4 - 5.0 g/dL    Alkaline Phosphatase 77 33 - 136 U/L    Total Protein 7.8 6.4 - 8.2 g/dL    AST 14 9 - 39 U/L    Bilirubin, Total 0.8 0.0 - 1.2 mg/dL    ALT 10 10 - 52 U/L   CBC and Auto Differential    Collection Time: 02/03/25  3:38 PM   Result Value Ref Range    WBC 6.3 4.4 - 11.3 x10*3/uL    nRBC 0.0 0.0 - 0.0 /100 WBCs    RBC 5.05 4.50 - 5.90 x10*6/uL    Hemoglobin 14.8 13.5 - 17.5 g/dL    Hematocrit 46.7 41.0 - 52.0 %    MCV 93 80 - 100 fL    MCH 29.3 26.0 - 34.0 pg    MCHC 31.7 (L) 32.0 - 36.0 g/dL    RDW 13.7 11.5 - 14.5 %    Platelets 174 150 - 450 x10*3/uL    Neutrophils % 55.3 40.0 - 80.0 %    Immature Granulocytes %, Automated 0.3 0.0 - 0.9 %    Lymphocytes % 34.4 13.0 - 44.0 %    Monocytes % 6.8 2.0 - 10.0 %    Eosinophils % 2.9 0.0 - 6.0 %    Basophils % 0.3 0.0 - 2.0 %    Neutrophils Absolute 3.48 1.20 - 7.70 x10*3/uL    Immature Granulocytes Absolute, Automated 0.02 0.00 - 0.70 x10*3/uL    Lymphocytes Absolute 2.17 1.20 - 4.80 x10*3/uL    Monocytes Absolute 0.43 0.10 - 1.00 x10*3/uL    Eosinophils Absolute 0.18 0.00 - 0.70 x10*3/uL    Basophils Absolute 0.02 0.00 - 0.10 x10*3/uL   Type And Screen    Collection Time: 02/03/25  3:38 PM   Result Value Ref Range    ABO TYPE O     Rh TYPE POS     ANTIBODY SCREEN NEG    MRSA pending    Diagnostic Results   CXRAY 2/4/25  IMPRESSION:  No radiographic evidence of acute cardiopulmonary process.    ECG: 10/30/24 see media tab  Sinus rhythm     Cardiac Cath 10/30/24  Summary:  Condition 1-PA wedge pressure=26/25 (20) mm Hg. LV presssure=92/30, 12 mm Hg. dP/ns=2429 mm  Hg/s  Mitral valve: There is severe regurgitation  Left ventricle: Systolic function is normal  Impressions:  No significant coronary artery disease is identified  Severe MR.    Transesophageal echo 10/15/24      Assessment and Plan:     Anesthesia  The patient denies problems with anesthesia in the past such as PONV, prolonged sedation, awareness, dental damage, aspiration, cardiac arrest, difficult intubation, or unexpected hospital admissions.     Neurology  The patient has no neurological diagnoses or significant findings on chart review, clinical presentation, and evaluation. No grossly apparent neurological perioperative risk. The patient is at increased risk for perioperative stroke secondary to cardiac disease, hypertension , general anesthesia, operative time >2.5 hours. Handouts for preoperative brain exercises given to patient.    HEENT/Airway  No diagnoses, significant findings on chart review, clinical presentation, or evaluation. No documented or reported history of airway difficulty.     Cardiovascular  #HTN  -managed on lisinopril, instructed to hold 24 hours prior to surgery. BP today 170/100.    #Mitral regurgitation  -severe by last echo, scheduled for surgery with Dr. Valdes on 2/13/25    METS  The patient's functional capacity is greater than 4 METS.  RCRI  The patient meets 1 RCRI criteria and therefore has a 6% risk of major adverse cardiac complications.  BRADEN score which indicates a 0.2% risk of intraoperative or 30-day postoperative MACE (major adverse cardiac event).    Cardiology Evaluation  The patient follows with cardiology, MD Kenyetta Smith 10/9/24 NPV for abnormal echo with concern for significant murmur.    He is scheduled for cardiac surgery.  Preoperative evaluation is complete pending results of labs, urine, nares.    Pulmonary  No significant findings on chart review or clinical presentation and evaluation. The patient is at increased risk of perioperative pulmonary  complications secondary to thoracic surgery, advanced age greater than 60.    STOP BANG 5, which places patient at high risk for having DOMINICK.    Recommend prioritizing non opioid analgesic techniques (regional and local anesthesia, nonsteroidal medications, etc) before the administration of opioids and close monitoring for hypoventilation after surgery due to DOMINICK/supsected DOMINICK. If intravenous narcotics are needed beyond the immediate indio-operative period, the patient may benefit from continuous pulse oximetry to monitor for hypoxic events till baseline Sp02 is normal on room air and  a respiratory therapy evaluation.    ARISCAT 50, High, 42.1% risk of in-hospital postoperative pulmonary complications  PRODIGY 16, high risk of respiratory depression episode.     Patient given PI sheet for preoperative deep breathing exercises.  Encourage  incentive spirometry in the postoperative period as deemed necessary.    Endocrine  No diagnoses or significant findings on chart review or clinical presentation and evaluation.    Gastrointestinal  The patient has HH, currently asymptomatic. No acute GI issues.    Eat 10- 0,  self-perceived oropharyngeal dysphagia scale (0-40)     Genitourinary  The patient has history of kidney stones s/p lithotripsy, followed by urology Dr. Duc Rodgers, LOV 10/17/23. Follow up prn. Reports no acute  complaints today.    Renal  No renal diagnoses or significant findings on chart review or clinical presentation and evaluation. The patient has specific risk factors associated with increased risk of perioperative renal complications related to age greater than 55, male gender, hypertension. Preventative measures include preoperative hydration.    Hematology  No diagnoses or significant findings on chart review or clinical presentation and evaluation.    Caprini score 10, high risk of perioperative VTE.     Patient instructed to ambulate as soon as possible postoperatively to decrease thromboembolic  risk. Initiate mechanical DVT prophylaxis as soon as possible and initiate chemical prophylaxis when deemed safe from a bleeding standpoint post surgery.     Transfusion Evaluation  A type and screen was obtained given the likelihood for perioperative transfusion of blood or blood products.    Musculoskeletal  No diagnoses or significant findings on chart review or clinical presentation and evaluation.    ID  No diagnoses or significant findings on chart review or clinical presentation and evaluation. MRSA screening obtained. Prescriptions and instructions given for Hibiclens and Peridex.    -Preoperative medication instructions were provided and reviewed with the patient.  Any additional testing or evaluation was explained to the patient.  NPO Instructions were discussed, and the patient's questions were answered prior to conclusion of this encounter. Patient verbalized understanding of preoperative instructions. After Visit Summary given.

## 2025-02-04 LAB
ABO GROUP (TYPE) IN BLOOD: NORMAL
ALBUMIN SERPL BCP-MCNC: 4.3 G/DL (ref 3.4–5)
ALP SERPL-CCNC: 77 U/L (ref 33–136)
ALT SERPL W P-5'-P-CCNC: 10 U/L (ref 10–52)
ANION GAP SERPL CALC-SCNC: NORMAL MMOL/L
ANTIBODY SCREEN: NORMAL
AST SERPL W P-5'-P-CCNC: 14 U/L (ref 9–39)
BASOPHILS # BLD AUTO: 0.02 X10*3/UL (ref 0–0.1)
BASOPHILS NFR BLD AUTO: 0.3 %
BILIRUB SERPL-MCNC: 0.8 MG/DL (ref 0–1.2)
BUN SERPL-MCNC: 14 MG/DL (ref 6–23)
CALCIUM SERPL-MCNC: 10 MG/DL (ref 8.6–10.6)
CHLORIDE SERPL-SCNC: 103 MMOL/L (ref 98–107)
CO2 SERPL-SCNC: 26 MMOL/L (ref 21–32)
CREAT SERPL-MCNC: 0.99 MG/DL (ref 0.5–1.3)
EGFRCR SERPLBLD CKD-EPI 2021: 85 ML/MIN/1.73M*2
EOSINOPHIL # BLD AUTO: 0.18 X10*3/UL (ref 0–0.7)
EOSINOPHIL NFR BLD AUTO: 2.9 %
ERYTHROCYTE [DISTWIDTH] IN BLOOD BY AUTOMATED COUNT: 13.7 % (ref 11.5–14.5)
GLUCOSE SERPL-MCNC: NORMAL MG/DL
HCT VFR BLD AUTO: 46.7 % (ref 41–52)
HGB BLD-MCNC: 14.8 G/DL (ref 13.5–17.5)
IMM GRANULOCYTES # BLD AUTO: 0.02 X10*3/UL (ref 0–0.7)
IMM GRANULOCYTES NFR BLD AUTO: 0.3 % (ref 0–0.9)
LYMPHOCYTES # BLD AUTO: 2.17 X10*3/UL (ref 1.2–4.8)
LYMPHOCYTES NFR BLD AUTO: 34.4 %
MCH RBC QN AUTO: 29.3 PG (ref 26–34)
MCHC RBC AUTO-ENTMCNC: 31.7 G/DL (ref 32–36)
MCV RBC AUTO: 93 FL (ref 80–100)
MONOCYTES # BLD AUTO: 0.43 X10*3/UL (ref 0.1–1)
MONOCYTES NFR BLD AUTO: 6.8 %
NEUTROPHILS # BLD AUTO: 3.48 X10*3/UL (ref 1.2–7.7)
NEUTROPHILS NFR BLD AUTO: 55.3 %
NRBC BLD-RTO: 0 /100 WBCS (ref 0–0)
PLATELET # BLD AUTO: 174 X10*3/UL (ref 150–450)
POTASSIUM SERPL-SCNC: NORMAL MMOL/L
PROT SERPL-MCNC: 7.8 G/DL (ref 6.4–8.2)
RBC # BLD AUTO: 5.05 X10*6/UL (ref 4.5–5.9)
RH FACTOR (ANTIGEN D): NORMAL
SODIUM SERPL-SCNC: 139 MMOL/L (ref 136–145)
WBC # BLD AUTO: 6.3 X10*3/UL (ref 4.4–11.3)

## 2025-02-05 LAB — STAPHYLOCOCCUS SPEC CULT: NORMAL

## 2025-02-12 ENCOUNTER — ANESTHESIA EVENT (OUTPATIENT)
Dept: OPERATING ROOM | Facility: HOSPITAL | Age: 65
DRG: 219 | End: 2025-02-12
Payer: COMMERCIAL

## 2025-02-12 NOTE — ANESTHESIA PREPROCEDURE EVALUATION
"Patient: Rigo Monet    Procedure Information       Date/Time: 02/13/25 0645    Procedure: REPAIR, MITRAL VALVE (Right)    Location: [facility] OR 21 / [facility] OR    Surgeons: Denilson Valdes MD            Relevant Problems   Cardiac   (+) Hypertension   (+) MR (mitral regurgitation)   (+) Mitral valve insufficiency, unspecified etiology     Per Dr. Valdes office note:  \"Patient comes to clinic to discuss treatment options for mitral regurgitation. Echocardiography demonstrates severe mitral regurgitation secondary to flail of segment of posterior mitral valve leaflet. Coronary angiography demonstrates normal coronary arteries without any significant atherosclerosis. Recommend mini right thoracotomy and repair of mitral valve. Will plan on right common femoral artery and vein cannulation as well as intercostal cryoanalgesia. If unable to repair valve we will plan on using a tissue valve. Risks, benefits, and alternatives discussed with patient, who wishes to proceed.\"    Clinical information reviewed:     Meds               NPO Detail:  No data recorded     Physical Exam    Airway  Mallampati: III  TM distance: <3 FB  Neck ROM: full     Cardiovascular   Rhythm: regular  Rate: normal  (+) murmur     Dental        Pulmonary   Breath sounds clear to auscultation     Abdominal        Anesthesia Plan    History of general anesthesia?: yes  History of complications of general anesthesia?: no    ASA 3     general   (GA ETT  Arterial line (left brachial likely)  RIJ CVC +/- PAC  VIDHI  Postop mechanical ventilation  Postop ICU care  Possible blood transfusions  )  intravenous induction     "

## 2025-02-12 NOTE — PROGRESS NOTES
Pharmacy Medication History Review    Rigo Monet is a 64 y.o. male who is planned to be admitted for MR (mitral regurgitation). Pharmacy called the patient prior to their scheduled procedure and reviewed the patient's [id number] medications for accuracy.    Medications ADDED:  none  Medications CHANGED:  none  Medications REMOVED:   none    Please review updated prior to admission medication list and comments regarding how patient may be taking medications differently by going to Admission tab --> Admission Orders --> Admit Orders / Review prior to admission medications.     Preferred pharmacy, last doses of medications, and allergies to be confirmed with patient by nursing the day of procedure.     [facility]  Pharmacy dispense history  Patient interview  Chart Review  Care Everywhere     Below are additional concerns with the patient's PTA list.  Patient states they stopped their supplements a week ago in preparation of procedure    Leslie Wilson CPAriana  Please reach out via Secure Chat for questions

## 2025-02-13 ENCOUNTER — HOSPITAL ENCOUNTER (OUTPATIENT)
Dept: OPERATING ROOM | Facility: HOSPITAL | Age: 65
Discharge: HOME | End: 2025-02-13

## 2025-02-13 ENCOUNTER — HOSPITAL ENCOUNTER (INPATIENT)
Facility: HOSPITAL | Age: 65
DRG: 219 | End: 2025-02-13
Attending: THORACIC SURGERY (CARDIOTHORACIC VASCULAR SURGERY) | Admitting: THORACIC SURGERY (CARDIOTHORACIC VASCULAR SURGERY)
Payer: COMMERCIAL

## 2025-02-13 ENCOUNTER — ANESTHESIA (OUTPATIENT)
Dept: OPERATING ROOM | Facility: HOSPITAL | Age: 65
DRG: 219 | End: 2025-02-13
Payer: COMMERCIAL

## 2025-02-13 ENCOUNTER — APPOINTMENT (OUTPATIENT)
Dept: RADIOLOGY | Facility: HOSPITAL | Age: 65
DRG: 219 | End: 2025-02-13
Payer: COMMERCIAL

## 2025-02-13 ENCOUNTER — HOSPITAL ENCOUNTER (OUTPATIENT)
Dept: OPERATING ROOM | Facility: HOSPITAL | Age: 65
Discharge: HOME | End: 2025-02-13
Payer: COMMERCIAL

## 2025-02-13 ENCOUNTER — APPOINTMENT (OUTPATIENT)
Dept: CARDIOLOGY | Facility: HOSPITAL | Age: 65
DRG: 219 | End: 2025-02-13
Payer: COMMERCIAL

## 2025-02-13 DIAGNOSIS — I34.0 MITRAL VALVE INSUFFICIENCY, UNSPECIFIED ETIOLOGY: Primary | ICD-10-CM

## 2025-02-13 DIAGNOSIS — Z98.890 S/P MVR (MITRAL VALVE REPAIR): ICD-10-CM

## 2025-02-13 LAB
ABO GROUP (TYPE) IN BLOOD: NORMAL
ACT BLD: 104 SEC (ref 82–174)
ACT BLD: 115 SEC (ref 82–174)
ACT BLD: 488 SEC (ref 82–174)
ACT BLD: 491 SEC (ref 82–174)
ACT BLD: 603 SEC (ref 82–174)
ACT BLD: 702 SEC (ref 82–174)
ACT BLD: 833 SEC (ref 82–174)
ALBUMIN SERPL BCP-MCNC: 4 G/DL (ref 3.4–5)
ANION GAP BLDA CALCULATED.4IONS-SCNC: 11 MMO/L (ref 10–25)
ANION GAP BLDA CALCULATED.4IONS-SCNC: 12 MMO/L (ref 10–25)
ANION GAP BLDA CALCULATED.4IONS-SCNC: 13 MMO/L (ref 10–25)
ANION GAP BLDA CALCULATED.4IONS-SCNC: 6 MMO/L (ref 10–25)
ANION GAP BLDA CALCULATED.4IONS-SCNC: 7 MMO/L (ref 10–25)
ANION GAP BLDA CALCULATED.4IONS-SCNC: 9 MMO/L (ref 10–25)
ANION GAP BLDV CALCULATED.4IONS-SCNC: 10 MMOL/L (ref 10–25)
ANION GAP SERPL CALC-SCNC: 11 MMOL/L (ref 10–20)
APTT PPP: 31 SECONDS (ref 27–38)
BASE EXCESS BLDA CALC-SCNC: -0.2 MMOL/L (ref -2–3)
BASE EXCESS BLDA CALC-SCNC: -0.4 MMOL/L (ref -2–3)
BASE EXCESS BLDA CALC-SCNC: -1 MMOL/L (ref -2–3)
BASE EXCESS BLDA CALC-SCNC: -1.7 MMOL/L (ref -2–3)
BASE EXCESS BLDA CALC-SCNC: -2.4 MMOL/L (ref -2–3)
BASE EXCESS BLDA CALC-SCNC: -3.2 MMOL/L (ref -2–3)
BASE EXCESS BLDA CALC-SCNC: -3.4 MMOL/L (ref -2–3)
BASE EXCESS BLDA CALC-SCNC: -3.6 MMOL/L (ref -2–3)
BASE EXCESS BLDA CALC-SCNC: -4 MMOL/L (ref -2–3)
BASE EXCESS BLDA CALC-SCNC: 0.3 MMOL/L (ref -2–3)
BASE EXCESS BLDA CALC-SCNC: 3 MMOL/L (ref -2–3)
BASE EXCESS BLDA CALC-SCNC: 3.9 MMOL/L (ref -2–3)
BASE EXCESS BLDV CALC-SCNC: -0.6 MMOL/L (ref -2–3)
BODY TEMPERATURE: 37 DEGREES CELSIUS
BUN SERPL-MCNC: 16 MG/DL (ref 6–23)
CA-I BLDA-SCNC: 0.94 MMOL/L (ref 1.1–1.33)
CA-I BLDA-SCNC: 0.99 MMOL/L (ref 1.1–1.33)
CA-I BLDA-SCNC: 1.06 MMOL/L (ref 1.1–1.33)
CA-I BLDA-SCNC: 1.09 MMOL/L (ref 1.1–1.33)
CA-I BLDA-SCNC: 1.14 MMOL/L (ref 1.1–1.33)
CA-I BLDA-SCNC: 1.18 MMOL/L (ref 1.1–1.33)
CA-I BLDA-SCNC: 1.18 MMOL/L (ref 1.1–1.33)
CA-I BLDA-SCNC: 1.19 MMOL/L (ref 1.1–1.33)
CA-I BLDA-SCNC: 1.2 MMOL/L (ref 1.1–1.33)
CA-I BLDA-SCNC: 1.22 MMOL/L (ref 1.1–1.33)
CA-I BLDA-SCNC: 1.28 MMOL/L (ref 1.1–1.33)
CA-I BLDA-SCNC: 1.35 MMOL/L (ref 1.1–1.33)
CA-I BLDV-SCNC: 1.13 MMOL/L (ref 1.1–1.33)
CALCIUM SERPL-MCNC: 8.5 MG/DL (ref 8.6–10.6)
CFT FORM KAOLIN IND BLD RES TEG: 1.8 MIN (ref 0.8–2.1)
CFT FORM KAOLIN IND BLD RES TEG: 1.8 MIN (ref 0.8–2.1)
CHLORIDE BLDA-SCNC: 105 MMOL/L (ref 98–107)
CHLORIDE BLDA-SCNC: 106 MMOL/L (ref 98–107)
CHLORIDE BLDA-SCNC: 107 MMOL/L (ref 98–107)
CHLORIDE BLDA-SCNC: 108 MMOL/L (ref 98–107)
CHLORIDE BLDV-SCNC: 107 MMOL/L (ref 98–107)
CHLORIDE SERPL-SCNC: 108 MMOL/L (ref 98–107)
CLOT ANGLE.KAOLIN INDUCED BLD RES TEG: 68 DEG (ref 63–78)
CLOT ANGLE.KAOLIN INDUCED BLD RES TEG: 69 DEG (ref 63–78)
CLOT INIT KAO IND P HEP NEUT BLD RES TEG: 5.5 MIN (ref 4.3–8.3)
CLOT INIT KAO IND P HEP NEUT BLD RES TEG: 6.1 MIN (ref 4.6–9.1)
CLOT INIT KAO IND P HEP NEUT BLD RES TEG: 7 MIN (ref 4.3–8.3)
CLOT INIT KAO IND P HEP NEUT BLD RES TEG: 8.2 MIN (ref 4.6–9.1)
CO2 SERPL-SCNC: 24 MMOL/L (ref 21–32)
COHGB MFR BLDA: 0.8 %
COHGB MFR BLDA: 0.9 %
COHGB MFR BLDA: 1 %
COHGB MFR BLDA: 1.1 %
COHGB MFR BLDA: 1.1 %
COHGB MFR BLDA: 1.2 %
COHGB MFR BLDA: 1.3 %
COHGB MFR BLDV: 0.9 %
CREAT SERPL-MCNC: 0.96 MG/DL (ref 0.5–1.3)
DO-HGB MFR BLDA: 0 % (ref 0–5)
DO-HGB MFR BLDA: 0 % (ref 0–5)
DO-HGB MFR BLDA: 0.1 % (ref 0–5)
DO-HGB MFR BLDA: 0.2 % (ref 0–5)
DO-HGB MFR BLDA: 0.3 % (ref 0–5)
DO-HGB MFR BLDA: 0.4 % (ref 0–5)
DO-HGB MFR BLDA: 0.4 % (ref 0–5)
DO-HGB MFR BLDA: 1.5 % (ref 0–5)
DO-HGB MFR BLDA: 1.7 % (ref 0–5)
EGFRCR SERPLBLD CKD-EPI 2021: 88 ML/MIN/1.73M*2
ERYTHROCYTE [DISTWIDTH] IN BLOOD BY AUTOMATED COUNT: 13.1 % (ref 11.5–14.5)
FIBRINOGEN BLD CALC-MCNC: 266 MG/DL (ref 278–581)
FIBRINOGEN BLD CALC-MCNC: 307 MG/DL (ref 278–581)
FIBRINOGEN PPP-MCNC: 203 MG/DL (ref 200–400)
GLUCOSE BLD MANUAL STRIP-MCNC: 139 MG/DL (ref 74–99)
GLUCOSE BLD MANUAL STRIP-MCNC: 147 MG/DL (ref 74–99)
GLUCOSE BLDA-MCNC: 118 MG/DL (ref 74–99)
GLUCOSE BLDA-MCNC: 122 MG/DL (ref 74–99)
GLUCOSE BLDA-MCNC: 124 MG/DL (ref 74–99)
GLUCOSE BLDA-MCNC: 127 MG/DL (ref 74–99)
GLUCOSE BLDA-MCNC: 127 MG/DL (ref 74–99)
GLUCOSE BLDA-MCNC: 138 MG/DL (ref 74–99)
GLUCOSE BLDA-MCNC: 146 MG/DL (ref 74–99)
GLUCOSE BLDA-MCNC: 147 MG/DL (ref 74–99)
GLUCOSE BLDA-MCNC: 156 MG/DL (ref 74–99)
GLUCOSE BLDA-MCNC: 174 MG/DL (ref 74–99)
GLUCOSE BLDA-MCNC: 197 MG/DL (ref 74–99)
GLUCOSE BLDA-MCNC: 98 MG/DL (ref 74–99)
GLUCOSE BLDV-MCNC: 122 MG/DL (ref 74–99)
GLUCOSE SERPL-MCNC: 124 MG/DL (ref 74–99)
HCO3 BLDA-SCNC: 22.7 MMOL/L (ref 22–26)
HCO3 BLDA-SCNC: 23 MMOL/L (ref 22–26)
HCO3 BLDA-SCNC: 23.2 MMOL/L (ref 22–26)
HCO3 BLDA-SCNC: 23.2 MMOL/L (ref 22–26)
HCO3 BLDA-SCNC: 23.5 MMOL/L (ref 22–26)
HCO3 BLDA-SCNC: 24.3 MMOL/L (ref 22–26)
HCO3 BLDA-SCNC: 24.3 MMOL/L (ref 22–26)
HCO3 BLDA-SCNC: 24.8 MMOL/L (ref 22–26)
HCO3 BLDA-SCNC: 24.8 MMOL/L (ref 22–26)
HCO3 BLDA-SCNC: 25.4 MMOL/L (ref 22–26)
HCO3 BLDA-SCNC: 27.1 MMOL/L (ref 22–26)
HCO3 BLDA-SCNC: 29.7 MMOL/L (ref 22–26)
HCO3 BLDV-SCNC: 25.4 MMOL/L (ref 22–26)
HCT VFR BLD AUTO: 36.4 % (ref 41–52)
HCT VFR BLD EST: 35 % (ref 41–52)
HCT VFR BLD EST: 36 % (ref 41–52)
HCT VFR BLD EST: 37 % (ref 41–52)
HCT VFR BLD EST: 38 % (ref 41–52)
HCT VFR BLD EST: 39 % (ref 41–52)
HCT VFR BLD EST: 40 % (ref 41–52)
HCT VFR BLD EST: 40 % (ref 41–52)
HCT VFR BLD EST: 43 % (ref 41–52)
HCT VFR BLD EST: 45 % (ref 41–52)
HGB BLD-MCNC: 12.4 G/DL (ref 13.5–17.5)
HGB BLDA-MCNC: 11.7 G/DL (ref 13.5–17.5)
HGB BLDA-MCNC: 11.7 G/DL (ref 13.5–17.5)
HGB BLDA-MCNC: 11.9 G/DL (ref 13.5–17.5)
HGB BLDA-MCNC: 11.9 G/DL (ref 13.5–17.5)
HGB BLDA-MCNC: 12.3 G/DL (ref 13.5–17.5)
HGB BLDA-MCNC: 12.3 G/DL (ref 13.5–17.5)
HGB BLDA-MCNC: 12.5 G/DL (ref 13.5–17.5)
HGB BLDA-MCNC: 12.5 G/DL (ref 13.5–17.5)
HGB BLDA-MCNC: 12.7 G/DL (ref 13.5–17.5)
HGB BLDA-MCNC: 12.8 G/DL (ref 13.5–17.5)
HGB BLDA-MCNC: 12.8 G/DL (ref 13.5–17.5)
HGB BLDA-MCNC: 12.9 G/DL (ref 13.5–17.5)
HGB BLDA-MCNC: 12.9 G/DL (ref 13.5–17.5)
HGB BLDA-MCNC: 13.3 G/DL (ref 13.5–17.5)
HGB BLDA-MCNC: 13.4 G/DL (ref 13.5–17.5)
HGB BLDA-MCNC: 14.4 G/DL (ref 13.5–17.5)
HGB BLDA-MCNC: 14.4 G/DL (ref 13.5–17.5)
HGB BLDA-MCNC: 14.9 G/DL (ref 13.5–17.5)
HGB BLDA-MCNC: 14.9 G/DL (ref 13.5–17.5)
HGB BLDV-MCNC: 12.8 G/DL (ref 13.5–17.5)
INHALED O2 CONCENTRATION: 100 %
INHALED O2 CONCENTRATION: 21 %
INHALED O2 CONCENTRATION: 40 %
INHALED O2 CONCENTRATION: 50 %
INHALED O2 CONCENTRATION: 50 %
INHALED O2 CONCENTRATION: 80 %
INHALED O2 CONCENTRATION: 90 %
INR PPP: 1.1 (ref 0.9–1.1)
LACTATE BLDA-SCNC: 0.7 MMOL/L (ref 0.4–2)
LACTATE BLDA-SCNC: 0.8 MMOL/L (ref 0.4–2)
LACTATE BLDA-SCNC: 0.8 MMOL/L (ref 0.4–2)
LACTATE BLDA-SCNC: 1 MMOL/L (ref 0.4–2)
LACTATE BLDA-SCNC: 1.3 MMOL/L (ref 0.4–2)
LACTATE BLDA-SCNC: 1.4 MMOL/L (ref 0.4–2)
LACTATE BLDA-SCNC: 1.6 MMOL/L (ref 0.4–2)
LACTATE BLDA-SCNC: 1.7 MMOL/L (ref 0.4–2)
LACTATE BLDA-SCNC: 1.9 MMOL/L (ref 0.4–2)
LACTATE BLDA-SCNC: 2.1 MMOL/L (ref 0.4–2)
LACTATE BLDV-SCNC: 0.8 MMOL/L (ref 0.4–2)
MA KAOLIN BLD RES TEG: 50 MM (ref 52–69)
MA KAOLIN BLD RES TEG: 50 MM (ref 52–69)
MA KAOLIN+TF BLD RES TEG: 50 MM (ref 52–70)
MA KAOLIN+TF BLD RES TEG: 53 MM (ref 52–70)
MA TF IND+IIB-IIIA INH BLD RES TEG: 15 MM (ref 15–32)
MA TF IND+IIB-IIIA INH BLD RES TEG: 17 MM (ref 15–32)
MAGNESIUM SERPL-MCNC: 3.31 MG/DL (ref 1.6–2.4)
MCH RBC QN AUTO: 29.4 PG (ref 26–34)
MCHC RBC AUTO-ENTMCNC: 34.1 G/DL (ref 32–36)
MCV RBC AUTO: 86 FL (ref 80–100)
METHGB MFR BLDA: 0.8 % (ref 0–1.5)
METHGB MFR BLDA: 0.9 % (ref 0–1.5)
METHGB MFR BLDA: 0.9 % (ref 0–1.5)
METHGB MFR BLDA: 1 % (ref 0–1.5)
METHGB MFR BLDA: 1.1 % (ref 0–1.5)
METHGB MFR BLDA: 1.1 % (ref 0–1.5)
METHGB MFR BLDV: 0.9 % (ref 0–1.5)
NRBC BLD-RTO: 0 /100 WBCS (ref 0–0)
OXYHGB MFR BLDA: 96.2 % (ref 94–98)
OXYHGB MFR BLDA: 96.2 % (ref 94–98)
OXYHGB MFR BLDA: 96.5 % (ref 94–98)
OXYHGB MFR BLDA: 96.5 % (ref 94–98)
OXYHGB MFR BLDA: 97 % (ref 94–98)
OXYHGB MFR BLDA: 97.2 % (ref 94–98)
OXYHGB MFR BLDA: 97.2 % (ref 94–98)
OXYHGB MFR BLDA: 97.4 % (ref 94–98)
OXYHGB MFR BLDA: 97.4 % (ref 94–98)
OXYHGB MFR BLDA: 97.7 % (ref 94–98)
OXYHGB MFR BLDA: 97.7 % (ref 94–98)
OXYHGB MFR BLDA: 97.8 % (ref 94–98)
OXYHGB MFR BLDA: 97.9 % (ref 94–98)
OXYHGB MFR BLDA: 97.9 % (ref 94–98)
OXYHGB MFR BLDA: 98 % (ref 94–98)
OXYHGB MFR BLDV: 84.1 % (ref 45–75)
PCO2 BLDA: 39 MM HG (ref 38–42)
PCO2 BLDA: 41 MM HG (ref 38–42)
PCO2 BLDA: 42 MM HG (ref 38–42)
PCO2 BLDA: 44 MM HG (ref 38–42)
PCO2 BLDA: 45 MM HG (ref 38–42)
PCO2 BLDA: 46 MM HG (ref 38–42)
PCO2 BLDA: 49 MM HG (ref 38–42)
PCO2 BLDA: 49 MM HG (ref 38–42)
PCO2 BLDA: 50 MM HG (ref 38–42)
PCO2 BLDV: 46 MM HG (ref 41–51)
PH BLDA: 7.28 PH (ref 7.38–7.42)
PH BLDA: 7.28 PH (ref 7.38–7.42)
PH BLDA: 7.31 PH (ref 7.38–7.42)
PH BLDA: 7.32 PH (ref 7.38–7.42)
PH BLDA: 7.34 PH (ref 7.38–7.42)
PH BLDA: 7.35 PH (ref 7.38–7.42)
PH BLDA: 7.37 PH (ref 7.38–7.42)
PH BLDA: 7.38 PH (ref 7.38–7.42)
PH BLDA: 7.39 PH (ref 7.38–7.42)
PH BLDA: 7.45 PH (ref 7.38–7.42)
PH BLDV: 7.35 PH (ref 7.33–7.43)
PHOSPHATE SERPL-MCNC: 1.1 MG/DL (ref 2.5–4.9)
PLATELET # BLD AUTO: 121 X10*3/UL (ref 150–450)
PO2 BLDA: 117 MM HG (ref 85–95)
PO2 BLDA: 124 MM HG (ref 85–95)
PO2 BLDA: 131 MM HG (ref 85–95)
PO2 BLDA: 137 MM HG (ref 85–95)
PO2 BLDA: 226 MM HG (ref 85–95)
PO2 BLDA: 227 MM HG (ref 85–95)
PO2 BLDA: 240 MM HG (ref 85–95)
PO2 BLDA: 262 MM HG (ref 85–95)
PO2 BLDA: 312 MM HG (ref 85–95)
PO2 BLDA: 313 MM HG (ref 85–95)
PO2 BLDA: 86 MM HG (ref 85–95)
PO2 BLDA: 88 MM HG (ref 85–95)
PO2 BLDV: 50 MM HG (ref 35–45)
POTASSIUM BLDA-SCNC: 4.1 MMOL/L (ref 3.5–5.3)
POTASSIUM BLDA-SCNC: 4.3 MMOL/L (ref 3.5–5.3)
POTASSIUM BLDA-SCNC: 4.4 MMOL/L (ref 3.5–5.3)
POTASSIUM BLDA-SCNC: 4.4 MMOL/L (ref 3.5–5.3)
POTASSIUM BLDA-SCNC: 4.5 MMOL/L (ref 3.5–5.3)
POTASSIUM BLDA-SCNC: 4.6 MMOL/L (ref 3.5–5.3)
POTASSIUM BLDA-SCNC: 5.2 MMOL/L (ref 3.5–5.3)
POTASSIUM BLDA-SCNC: 5.8 MMOL/L (ref 3.5–5.3)
POTASSIUM BLDA-SCNC: 6 MMOL/L (ref 3.5–5.3)
POTASSIUM BLDA-SCNC: 6 MMOL/L (ref 3.5–5.3)
POTASSIUM BLDV-SCNC: 4.5 MMOL/L (ref 3.5–5.3)
POTASSIUM SERPL-SCNC: 4.4 MMOL/L (ref 3.5–5.3)
PROTHROMBIN TIME: 12.6 SECONDS (ref 9.8–12.8)
RBC # BLD AUTO: 4.22 X10*6/UL (ref 4.5–5.9)
RH FACTOR (ANTIGEN D): NORMAL
SAO2 % BLDA: 100 % (ref 94–100)
SAO2 % BLDA: 98 % (ref 94–100)
SAO2 % BLDA: 99 % (ref 94–100)
SAO2 % BLDV: 86 % (ref 45–75)
SODIUM BLDA-SCNC: 135 MMOL/L (ref 136–145)
SODIUM BLDA-SCNC: 135 MMOL/L (ref 136–145)
SODIUM BLDA-SCNC: 136 MMOL/L (ref 136–145)
SODIUM BLDA-SCNC: 137 MMOL/L (ref 136–145)
SODIUM BLDA-SCNC: 137 MMOL/L (ref 136–145)
SODIUM BLDA-SCNC: 138 MMOL/L (ref 136–145)
SODIUM BLDA-SCNC: 140 MMOL/L (ref 136–145)
SODIUM BLDA-SCNC: 141 MMOL/L (ref 136–145)
SODIUM BLDV-SCNC: 138 MMOL/L (ref 136–145)
SODIUM SERPL-SCNC: 139 MMOL/L (ref 136–145)
TEST COMMENT: ABNORMAL
TEST COMMENT: ABNORMAL
WBC # BLD AUTO: 11.5 X10*3/UL (ref 4.4–11.3)

## 2025-02-13 PROCEDURE — A33426 PR MITRALPLASTY W PROSTHETIC RING: Performed by: ANESTHESIOLOGY

## 2025-02-13 PROCEDURE — 2500000005 HC RX 250 GENERAL PHARMACY W/O HCPCS: Performed by: THORACIC SURGERY (CARDIOTHORACIC VASCULAR SURGERY)

## 2025-02-13 PROCEDURE — 71045 X-RAY EXAM CHEST 1 VIEW: CPT

## 2025-02-13 PROCEDURE — 76937 US GUIDE VASCULAR ACCESS: CPT | Performed by: ANESTHESIOLOGY

## 2025-02-13 PROCEDURE — 84132 ASSAY OF SERUM POTASSIUM: CPT

## 2025-02-13 PROCEDURE — 36415 COLL VENOUS BLD VENIPUNCTURE: CPT

## 2025-02-13 PROCEDURE — 87081 CULTURE SCREEN ONLY: CPT

## 2025-02-13 PROCEDURE — 36620 INSERTION CATHETER ARTERY: CPT | Performed by: ANESTHESIOLOGY

## 2025-02-13 PROCEDURE — A33426 PR MITRALPLASTY W PROSTHETIC RING: Performed by: NURSE ANESTHETIST, CERTIFIED REGISTERED

## 2025-02-13 PROCEDURE — A4312 CATH W/O BAG 2-WAY SILICONE: HCPCS | Performed by: THORACIC SURGERY (CARDIOTHORACIC VASCULAR SURGERY)

## 2025-02-13 PROCEDURE — 2500000005 HC RX 250 GENERAL PHARMACY W/O HCPCS

## 2025-02-13 PROCEDURE — 3700000002 HC GENERAL ANESTHESIA TIME - EACH INCREMENTAL 1 MINUTE: Performed by: THORACIC SURGERY (CARDIOTHORACIC VASCULAR SURGERY)

## 2025-02-13 PROCEDURE — 3600000017 HC OR TIME - EACH INCREMENTAL 1 MINUTE - PROCEDURE LEVEL SIX: Performed by: THORACIC SURGERY (CARDIOTHORACIC VASCULAR SURGERY)

## 2025-02-13 PROCEDURE — 2500000004 HC RX 250 GENERAL PHARMACY W/ HCPCS (ALT 636 FOR OP/ED): Performed by: THORACIC SURGERY (CARDIOTHORACIC VASCULAR SURGERY)

## 2025-02-13 PROCEDURE — 82375 ASSAY CARBOXYHB QUANT: CPT

## 2025-02-13 PROCEDURE — 85384 FIBRINOGEN ACTIVITY: CPT

## 2025-02-13 PROCEDURE — 2500000002 HC RX 250 W HCPCS SELF ADMINISTERED DRUGS (ALT 637 FOR MEDICARE OP, ALT 636 FOR OP/ED)

## 2025-02-13 PROCEDURE — 2500000001 HC RX 250 WO HCPCS SELF ADMINISTERED DRUGS (ALT 637 FOR MEDICARE OP)

## 2025-02-13 PROCEDURE — C1769 GUIDE WIRE: HCPCS | Performed by: THORACIC SURGERY (CARDIOTHORACIC VASCULAR SURGERY)

## 2025-02-13 PROCEDURE — 3600000012 HC PERFUSION TIME - EACH INCREMENTAL 1 MINUTE: Performed by: THORACIC SURGERY (CARDIOTHORACIC VASCULAR SURGERY)

## 2025-02-13 PROCEDURE — 0753T DGTZ GLS MCRSCP SLD LEVEL IV: CPT | Mod: TC,SUR | Performed by: THORACIC SURGERY (CARDIOTHORACIC VASCULAR SURGERY)

## 2025-02-13 PROCEDURE — 36556 INSERT NON-TUNNEL CV CATH: CPT | Performed by: ANESTHESIOLOGY

## 2025-02-13 PROCEDURE — 2500000004 HC RX 250 GENERAL PHARMACY W/ HCPCS (ALT 636 FOR OP/ED)

## 2025-02-13 PROCEDURE — 3600000018 HC OR TIME - INITIAL BASE CHARGE - PROCEDURE LEVEL SIX: Performed by: THORACIC SURGERY (CARDIOTHORACIC VASCULAR SURGERY)

## 2025-02-13 PROCEDURE — 93010 ELECTROCARDIOGRAM REPORT: CPT | Performed by: INTERNAL MEDICINE

## 2025-02-13 PROCEDURE — 83735 ASSAY OF MAGNESIUM: CPT

## 2025-02-13 PROCEDURE — 82947 ASSAY GLUCOSE BLOOD QUANT: CPT

## 2025-02-13 PROCEDURE — 93005 ELECTROCARDIOGRAM TRACING: CPT

## 2025-02-13 PROCEDURE — 2500000002 HC RX 250 W HCPCS SELF ADMINISTERED DRUGS (ALT 637 FOR MEDICARE OP, ALT 636 FOR OP/ED): Performed by: NURSE PRACTITIONER

## 2025-02-13 PROCEDURE — 37799 UNLISTED PX VASCULAR SURGERY: CPT

## 2025-02-13 PROCEDURE — 85347 COAGULATION TIME ACTIVATED: CPT

## 2025-02-13 PROCEDURE — 2780000003 HC OR 278 NO HCPCS: Performed by: THORACIC SURGERY (CARDIOTHORACIC VASCULAR SURGERY)

## 2025-02-13 PROCEDURE — 33426 REPAIR OF MITRAL VALVE: CPT | Performed by: THORACIC SURGERY (CARDIOTHORACIC VASCULAR SURGERY)

## 2025-02-13 PROCEDURE — 85610 PROTHROMBIN TIME: CPT

## 2025-02-13 PROCEDURE — 3600000011 HC PERFUSION TIME - INITIAL BASE CHARGE: Performed by: THORACIC SURGERY (CARDIOTHORACIC VASCULAR SURGERY)

## 2025-02-13 PROCEDURE — 2500000004 HC RX 250 GENERAL PHARMACY W/ HCPCS (ALT 636 FOR OP/ED): Performed by: NURSE PRACTITIONER

## 2025-02-13 PROCEDURE — 2020000001 HC ICU ROOM DAILY

## 2025-02-13 PROCEDURE — 3700000001 HC GENERAL ANESTHESIA TIME - INITIAL BASE CHARGE: Performed by: THORACIC SURGERY (CARDIOTHORACIC VASCULAR SURGERY)

## 2025-02-13 PROCEDURE — 2720000007 HC OR 272 NO HCPCS: Performed by: THORACIC SURGERY (CARDIOTHORACIC VASCULAR SURGERY)

## 2025-02-13 PROCEDURE — 99291 CRITICAL CARE FIRST HOUR: CPT

## 2025-02-13 PROCEDURE — 85027 COMPLETE CBC AUTOMATED: CPT

## 2025-02-13 PROCEDURE — C1889 IMPLANT/INSERT DEVICE, NOC: HCPCS | Performed by: THORACIC SURGERY (CARDIOTHORACIC VASCULAR SURGERY)

## 2025-02-13 PROCEDURE — A4649 SURGICAL SUPPLIES: HCPCS | Performed by: THORACIC SURGERY (CARDIOTHORACIC VASCULAR SURGERY)

## 2025-02-13 DEVICE — BAND 7700FB36 SIMUPLUS FLEX 36MM US
Type: IMPLANTABLE DEVICE | Site: HEART | Status: FUNCTIONAL
Brand: SIMUPLUS™

## 2025-02-13 RX ORDER — NITROGLYCERIN 20 MG/100ML
0-200 INJECTION INTRAVENOUS CONTINUOUS
Status: DISCONTINUED | OUTPATIENT
Start: 2025-02-13 | End: 2025-02-14

## 2025-02-13 RX ORDER — NAPROXEN SODIUM 220 MG/1
81 TABLET, FILM COATED ORAL DAILY
Status: DISCONTINUED | OUTPATIENT
Start: 2025-02-14 | End: 2025-02-14

## 2025-02-13 RX ORDER — POTASSIUM CHLORIDE 20 MEQ/1
20 TABLET, EXTENDED RELEASE ORAL EVERY 6 HOURS PRN
Status: DISCONTINUED | OUTPATIENT
Start: 2025-02-13 | End: 2025-02-14

## 2025-02-13 RX ORDER — HYDRALAZINE HYDROCHLORIDE 20 MG/ML
10 INJECTION INTRAMUSCULAR; INTRAVENOUS EVERY 4 HOURS PRN
Status: DISCONTINUED | OUTPATIENT
Start: 2025-02-13 | End: 2025-02-14

## 2025-02-13 RX ORDER — NOREPINEPHRINE BITARTRATE 0.03 MG/ML
INJECTION, SOLUTION INTRAVENOUS CONTINUOUS PRN
Status: DISCONTINUED | OUTPATIENT
Start: 2025-02-13 | End: 2025-02-13

## 2025-02-13 RX ORDER — NEOSTIGMINE METHYLSULFATE 1 MG/ML
INJECTION INTRAVENOUS AS NEEDED
Status: DISCONTINUED | OUTPATIENT
Start: 2025-02-13 | End: 2025-02-13

## 2025-02-13 RX ORDER — CALCIUM GLUCONATE 20 MG/ML
2 INJECTION, SOLUTION INTRAVENOUS EVERY 6 HOURS PRN
Status: DISCONTINUED | OUTPATIENT
Start: 2025-02-13 | End: 2025-02-14

## 2025-02-13 RX ORDER — OXYCODONE HYDROCHLORIDE 10 MG/1
10 TABLET ORAL EVERY 4 HOURS PRN
Status: DISCONTINUED | OUTPATIENT
Start: 2025-02-13 | End: 2025-02-18 | Stop reason: HOSPADM

## 2025-02-13 RX ORDER — POTASSIUM CHLORIDE 14.9 MG/ML
20 INJECTION INTRAVENOUS EVERY 6 HOURS PRN
Status: DISCONTINUED | OUTPATIENT
Start: 2025-02-13 | End: 2025-02-14

## 2025-02-13 RX ORDER — PANTOPRAZOLE SODIUM 40 MG/1
40 TABLET, DELAYED RELEASE ORAL
Status: DISCONTINUED | OUTPATIENT
Start: 2025-02-14 | End: 2025-02-13

## 2025-02-13 RX ORDER — POLYETHYLENE GLYCOL 3350 17 G/17G
17 POWDER, FOR SOLUTION ORAL 2 TIMES DAILY
Status: DISCONTINUED | OUTPATIENT
Start: 2025-02-13 | End: 2025-02-18 | Stop reason: HOSPADM

## 2025-02-13 RX ORDER — WATER 1000 ML/1000ML
INJECTION, SOLUTION INTRAVENOUS CONTINUOUS PRN
Status: COMPLETED | OUTPATIENT
Start: 2025-02-13 | End: 2025-02-13

## 2025-02-13 RX ORDER — ROCURONIUM BROMIDE 10 MG/ML
INJECTION, SOLUTION INTRAVENOUS AS NEEDED
Status: DISCONTINUED | OUTPATIENT
Start: 2025-02-13 | End: 2025-02-13

## 2025-02-13 RX ORDER — ACETAMINOPHEN 325 MG/1
650 TABLET ORAL EVERY 6 HOURS
Status: DISCONTINUED | OUTPATIENT
Start: 2025-02-13 | End: 2025-02-18 | Stop reason: HOSPADM

## 2025-02-13 RX ORDER — SODIUM CHLORIDE 0.9 G/100ML
INJECTION, SOLUTION IRRIGATION AS NEEDED
Status: DISCONTINUED | OUTPATIENT
Start: 2025-02-13 | End: 2025-02-13 | Stop reason: HOSPADM

## 2025-02-13 RX ORDER — INSULIN LISPRO 100 [IU]/ML
0-15 INJECTION, SOLUTION INTRAVENOUS; SUBCUTANEOUS EVERY 4 HOURS
Status: DISCONTINUED | OUTPATIENT
Start: 2025-02-13 | End: 2025-02-14

## 2025-02-13 RX ORDER — CEFAZOLIN 1 G/1
INJECTION, POWDER, FOR SOLUTION INTRAVENOUS AS NEEDED
Status: DISCONTINUED | OUTPATIENT
Start: 2025-02-13 | End: 2025-02-13

## 2025-02-13 RX ORDER — HEPARIN SODIUM 1000 [USP'U]/ML
INJECTION, SOLUTION INTRAVENOUS; SUBCUTANEOUS AS NEEDED
Status: DISCONTINUED | OUTPATIENT
Start: 2025-02-13 | End: 2025-02-13

## 2025-02-13 RX ORDER — CALCIUM CHLORIDE INJECTION 100 MG/ML
INJECTION, SOLUTION INTRAVENOUS AS NEEDED
Status: DISCONTINUED | OUTPATIENT
Start: 2025-02-13 | End: 2025-02-13

## 2025-02-13 RX ORDER — LIDOCAINE HYDROCHLORIDE 20 MG/ML
INJECTION, SOLUTION INFILTRATION; PERINEURAL AS NEEDED
Status: DISCONTINUED | OUTPATIENT
Start: 2025-02-13 | End: 2025-02-13

## 2025-02-13 RX ORDER — MIDAZOLAM HYDROCHLORIDE 1 MG/ML
INJECTION INTRAMUSCULAR; INTRAVENOUS AS NEEDED
Status: DISCONTINUED | OUTPATIENT
Start: 2025-02-13 | End: 2025-02-13

## 2025-02-13 RX ORDER — POTASSIUM CHLORIDE 1.5 G/1.58G
40 POWDER, FOR SOLUTION ORAL EVERY 6 HOURS PRN
Status: DISCONTINUED | OUTPATIENT
Start: 2025-02-13 | End: 2025-02-14

## 2025-02-13 RX ORDER — CEFAZOLIN SODIUM 2 G/100ML
2 INJECTION, SOLUTION INTRAVENOUS EVERY 8 HOURS
Status: COMPLETED | OUTPATIENT
Start: 2025-02-13 | End: 2025-02-15

## 2025-02-13 RX ORDER — SODIUM CHLORIDE 9 MG/ML
INJECTION, SOLUTION INTRAVENOUS CONTINUOUS PRN
Status: DISCONTINUED | OUTPATIENT
Start: 2025-02-13 | End: 2025-02-13

## 2025-02-13 RX ORDER — PROPOFOL 10 MG/ML
INJECTION, EMULSION INTRAVENOUS AS NEEDED
Status: DISCONTINUED | OUTPATIENT
Start: 2025-02-13 | End: 2025-02-13

## 2025-02-13 RX ORDER — ONDANSETRON HYDROCHLORIDE 2 MG/ML
4 INJECTION, SOLUTION INTRAVENOUS EVERY 8 HOURS PRN
Status: DISCONTINUED | OUTPATIENT
Start: 2025-02-13 | End: 2025-02-18 | Stop reason: HOSPADM

## 2025-02-13 RX ORDER — OXYCODONE HYDROCHLORIDE 5 MG/1
5 TABLET ORAL EVERY 4 HOURS PRN
Status: DISCONTINUED | OUTPATIENT
Start: 2025-02-13 | End: 2025-02-18 | Stop reason: HOSPADM

## 2025-02-13 RX ORDER — PROTAMINE SULFATE 10 MG/ML
INJECTION, SOLUTION INTRAVENOUS AS NEEDED
Status: DISCONTINUED | OUTPATIENT
Start: 2025-02-13 | End: 2025-02-13

## 2025-02-13 RX ORDER — GLYCOPYRROLATE 0.2 MG/ML
INJECTION INTRAMUSCULAR; INTRAVENOUS AS NEEDED
Status: DISCONTINUED | OUTPATIENT
Start: 2025-02-13 | End: 2025-02-13

## 2025-02-13 RX ORDER — HYDROMORPHONE HYDROCHLORIDE 0.2 MG/ML
0.2 INJECTION INTRAMUSCULAR; INTRAVENOUS; SUBCUTANEOUS
Status: DISCONTINUED | OUTPATIENT
Start: 2025-02-13 | End: 2025-02-14

## 2025-02-13 RX ORDER — DEXTROSE 50 % IN WATER (D50W) INTRAVENOUS SYRINGE
AS NEEDED
Status: DISCONTINUED | OUTPATIENT
Start: 2025-02-13 | End: 2025-02-13

## 2025-02-13 RX ORDER — PROPOFOL 10 MG/ML
0-50 INJECTION, EMULSION INTRAVENOUS CONTINUOUS
Status: DISCONTINUED | OUTPATIENT
Start: 2025-02-13 | End: 2025-02-13

## 2025-02-13 RX ORDER — POLYETHYLENE GLYCOL 3350 17 G/17G
17 POWDER, FOR SOLUTION ORAL DAILY
Status: DISCONTINUED | OUTPATIENT
Start: 2025-02-13 | End: 2025-02-13

## 2025-02-13 RX ORDER — NITROGLYCERIN 40 MG/100ML
INJECTION INTRAVENOUS AS NEEDED
Status: DISCONTINUED | OUTPATIENT
Start: 2025-02-13 | End: 2025-02-13

## 2025-02-13 RX ORDER — SODIUM CHLORIDE, SODIUM LACTATE, POTASSIUM CHLORIDE, CALCIUM CHLORIDE 600; 310; 30; 20 MG/100ML; MG/100ML; MG/100ML; MG/100ML
30 INJECTION, SOLUTION INTRAVENOUS CONTINUOUS
Status: DISCONTINUED | OUTPATIENT
Start: 2025-02-13 | End: 2025-02-14

## 2025-02-13 RX ORDER — MAGNESIUM SULFATE HEPTAHYDRATE 500 MG/ML
INJECTION, SOLUTION INTRAMUSCULAR; INTRAVENOUS AS NEEDED
Status: DISCONTINUED | OUTPATIENT
Start: 2025-02-13 | End: 2025-02-13

## 2025-02-13 RX ORDER — SODIUM CHLORIDE, SODIUM GLUCONATE, SODIUM ACETATE, POTASSIUM CHLORIDE AND MAGNESIUM CHLORIDE 30; 37; 368; 526; 502 MG/100ML; MG/100ML; MG/100ML; MG/100ML; MG/100ML
INJECTION, SOLUTION INTRAVENOUS CONTINUOUS PRN
Status: DISCONTINUED | OUTPATIENT
Start: 2025-02-13 | End: 2025-02-13

## 2025-02-13 RX ORDER — POTASSIUM CHLORIDE 1.5 G/1.58G
20 POWDER, FOR SOLUTION ORAL EVERY 6 HOURS PRN
Status: DISCONTINUED | OUTPATIENT
Start: 2025-02-13 | End: 2025-02-14

## 2025-02-13 RX ORDER — PANTOPRAZOLE SODIUM 40 MG/10ML
40 INJECTION, POWDER, LYOPHILIZED, FOR SOLUTION INTRAVENOUS
Status: DISCONTINUED | OUTPATIENT
Start: 2025-02-14 | End: 2025-02-13

## 2025-02-13 RX ORDER — METHADONE IN SOD CHLOR,ISO-OSM 10 MG/ML
SYRINGE (ML) INTRAVENOUS AS NEEDED
Status: DISCONTINUED | OUTPATIENT
Start: 2025-02-13 | End: 2025-02-13

## 2025-02-13 RX ORDER — NALOXONE HYDROCHLORIDE 0.4 MG/ML
0.2 INJECTION, SOLUTION INTRAMUSCULAR; INTRAVENOUS; SUBCUTANEOUS EVERY 5 MIN PRN
Status: DISCONTINUED | OUTPATIENT
Start: 2025-02-13 | End: 2025-02-18 | Stop reason: HOSPADM

## 2025-02-13 RX ORDER — POTASSIUM CHLORIDE 20 MEQ/1
40 TABLET, EXTENDED RELEASE ORAL EVERY 6 HOURS PRN
Status: DISCONTINUED | OUTPATIENT
Start: 2025-02-13 | End: 2025-02-14

## 2025-02-13 RX ORDER — DOXYCYCLINE 100 MG/10ML
INJECTION, POWDER, LYOPHILIZED, FOR SOLUTION INTRAVENOUS AS NEEDED
Status: DISCONTINUED | OUTPATIENT
Start: 2025-02-13 | End: 2025-02-13 | Stop reason: HOSPADM

## 2025-02-13 RX ORDER — MAGNESIUM SULFATE HEPTAHYDRATE 40 MG/ML
2 INJECTION, SOLUTION INTRAVENOUS EVERY 6 HOURS PRN
Status: DISCONTINUED | OUTPATIENT
Start: 2025-02-13 | End: 2025-02-14

## 2025-02-13 RX ORDER — CALCIUM GLUCONATE 20 MG/ML
1 INJECTION, SOLUTION INTRAVENOUS EVERY 6 HOURS PRN
Status: DISCONTINUED | OUTPATIENT
Start: 2025-02-13 | End: 2025-02-14

## 2025-02-13 RX ORDER — POTASSIUM CHLORIDE 29.8 MG/ML
40 INJECTION INTRAVENOUS EVERY 6 HOURS PRN
Status: DISCONTINUED | OUTPATIENT
Start: 2025-02-13 | End: 2025-02-14

## 2025-02-13 RX ORDER — ONDANSETRON 4 MG/1
4 TABLET, FILM COATED ORAL EVERY 8 HOURS PRN
Status: DISCONTINUED | OUTPATIENT
Start: 2025-02-13 | End: 2025-02-18 | Stop reason: HOSPADM

## 2025-02-13 RX ORDER — AMOXICILLIN 250 MG
2 CAPSULE ORAL 2 TIMES DAILY
Status: DISCONTINUED | OUTPATIENT
Start: 2025-02-13 | End: 2025-02-18 | Stop reason: HOSPADM

## 2025-02-13 RX ORDER — MAGNESIUM SULFATE HEPTAHYDRATE 40 MG/ML
4 INJECTION, SOLUTION INTRAVENOUS EVERY 6 HOURS PRN
Status: DISCONTINUED | OUTPATIENT
Start: 2025-02-13 | End: 2025-02-14

## 2025-02-13 RX ORDER — FENTANYL CITRATE 50 UG/ML
INJECTION, SOLUTION INTRAMUSCULAR; INTRAVENOUS AS NEEDED
Status: DISCONTINUED | OUTPATIENT
Start: 2025-02-13 | End: 2025-02-13

## 2025-02-13 RX ORDER — ACETAMINOPHEN 10 MG/ML
INJECTION, SOLUTION INTRAVENOUS AS NEEDED
Status: DISCONTINUED | OUTPATIENT
Start: 2025-02-13 | End: 2025-02-13

## 2025-02-13 RX ADMIN — NOREPINEPHRINE BITARTRATE 8 MCG: 1 INJECTION, SOLUTION, CONCENTRATE INTRAVENOUS at 11:02

## 2025-02-13 RX ADMIN — FENTANYL CITRATE 50 MCG: 50 INJECTION, SOLUTION INTRAMUSCULAR; INTRAVENOUS at 08:20

## 2025-02-13 RX ADMIN — CALCIUM CHLORIDE 1 G: 100 INJECTION, SOLUTION INTRAVENOUS at 10:32

## 2025-02-13 RX ADMIN — SODIUM CHLORIDE: 9 INJECTION, SOLUTION INTRAVENOUS at 08:33

## 2025-02-13 RX ADMIN — PROPOFOL 50 MG: 10 INJECTION, EMULSION INTRAVENOUS at 07:49

## 2025-02-13 RX ADMIN — GLYCOPYRROLATE 0.8 MG: 0.2 INJECTION, SOLUTION INTRAMUSCULAR; INTRAVENOUS at 12:08

## 2025-02-13 RX ADMIN — DEXTROSE MONOHYDRATE 12.5 G: 25 INJECTION, SOLUTION INTRAVENOUS at 10:50

## 2025-02-13 RX ADMIN — NOREPINEPHRINE BITARTRATE 16 MCG: 1 INJECTION, SOLUTION, CONCENTRATE INTRAVENOUS at 10:56

## 2025-02-13 RX ADMIN — Medication 2 L/MIN: at 20:24

## 2025-02-13 RX ADMIN — ROCURONIUM BROMIDE 50 MG: 10 INJECTION INTRAVENOUS at 08:08

## 2025-02-13 RX ADMIN — FENTANYL CITRATE 100 MCG: 50 INJECTION, SOLUTION INTRAMUSCULAR; INTRAVENOUS at 08:08

## 2025-02-13 RX ADMIN — NITROGLYCERIN 20 MCG/MIN: 20 INJECTION INTRAVENOUS at 13:45

## 2025-02-13 RX ADMIN — FENTANYL CITRATE 100 MCG: 50 INJECTION, SOLUTION INTRAMUSCULAR; INTRAVENOUS at 10:47

## 2025-02-13 RX ADMIN — FENTANYL CITRATE 150 MCG: 50 INJECTION, SOLUTION INTRAMUSCULAR; INTRAVENOUS at 09:16

## 2025-02-13 RX ADMIN — ACETAMINOPHEN 1000 MG: 10 INJECTION, SOLUTION INTRAVENOUS at 11:12

## 2025-02-13 RX ADMIN — LIDOCAINE HYDROCHLORIDE 100 MG: 20 INJECTION, SOLUTION INFILTRATION; PERINEURAL at 07:39

## 2025-02-13 RX ADMIN — TRANEXAMIC ACID 1455 MG: 100 INJECTION INTRAVENOUS at 08:19

## 2025-02-13 RX ADMIN — HYDROMORPHONE HYDROCHLORIDE 0.2 MG: 0.2 INJECTION, SOLUTION INTRAMUSCULAR; INTRAVENOUS; SUBCUTANEOUS at 15:09

## 2025-02-13 RX ADMIN — NITROGLYCERIN 50 MCG: 10 INJECTION INTRAVENOUS at 10:46

## 2025-02-13 RX ADMIN — NEOSTIGMINE METHYLSULFATE 4 MG: 1 INJECTION INTRAVENOUS at 12:08

## 2025-02-13 RX ADMIN — ACETAMINOPHEN 650 MG: 325 TABLET ORAL at 23:06

## 2025-02-13 RX ADMIN — Medication 0.02 MCG/KG/MIN: at 11:02

## 2025-02-13 RX ADMIN — PROTAMINE SULFATE 340 MG: 10 INJECTION, SOLUTION INTRAVENOUS at 10:53

## 2025-02-13 RX ADMIN — CEFAZOLIN 2 G: 1 INJECTION, POWDER, FOR SOLUTION INTRAMUSCULAR; INTRAVENOUS at 08:33

## 2025-02-13 RX ADMIN — NOREPINEPHRINE BITARTRATE 8 MCG: 1 INJECTION, SOLUTION, CONCENTRATE INTRAVENOUS at 11:08

## 2025-02-13 RX ADMIN — ROCURONIUM BROMIDE 50 MG: 10 INJECTION INTRAVENOUS at 09:31

## 2025-02-13 RX ADMIN — FENTANYL CITRATE 50 MCG: 50 INJECTION, SOLUTION INTRAMUSCULAR; INTRAVENOUS at 07:42

## 2025-02-13 RX ADMIN — SODIUM CHLORIDE, SODIUM GLUCONATE, SODIUM ACETATE, POTASSIUM CHLORIDE AND MAGNESIUM CHLORIDE: 526; 502; 368; 37; 30 INJECTION, SOLUTION INTRAVENOUS at 07:30

## 2025-02-13 RX ADMIN — MAGNESIUM SULFATE HEPTAHYDRATE 2 G: 500 INJECTION, SOLUTION INTRAMUSCULAR; INTRAVENOUS at 10:19

## 2025-02-13 RX ADMIN — FENTANYL CITRATE 50 MCG: 50 INJECTION, SOLUTION INTRAMUSCULAR; INTRAVENOUS at 10:50

## 2025-02-13 RX ADMIN — INSULIN HUMAN 10 UNITS: 100 INJECTION, SOLUTION PARENTERAL at 10:46

## 2025-02-13 RX ADMIN — ONDANSETRON 4 MG: 2 INJECTION INTRAMUSCULAR; INTRAVENOUS at 16:43

## 2025-02-13 RX ADMIN — PROPOFOL 30 MCG/KG/MIN: 10 INJECTION, EMULSION INTRAVENOUS at 11:19

## 2025-02-13 RX ADMIN — INSULIN LISPRO 5 UNITS: 100 INJECTION, SOLUTION INTRAVENOUS; SUBCUTANEOUS at 22:10

## 2025-02-13 RX ADMIN — MIDAZOLAM HYDROCHLORIDE 2 MG: 1 INJECTION, SOLUTION INTRAMUSCULAR; INTRAVENOUS at 07:26

## 2025-02-13 RX ADMIN — POLYETHYLENE GLYCOL 3350 17 G: 17 POWDER, FOR SOLUTION ORAL at 12:56

## 2025-02-13 RX ADMIN — Medication 10 MG: at 08:59

## 2025-02-13 RX ADMIN — CEFAZOLIN SODIUM 2 G: 2 INJECTION, SOLUTION INTRAVENOUS at 15:43

## 2025-02-13 RX ADMIN — ROCURONIUM BROMIDE 100 MG: 10 INJECTION INTRAVENOUS at 07:39

## 2025-02-13 RX ADMIN — TRANEXAMIC ACID 3 MG/KG/HR: 100 INJECTION INTRAVENOUS at 08:39

## 2025-02-13 RX ADMIN — SENNOSIDES AND DOCUSATE SODIUM 2 TABLET: 50; 8.6 TABLET ORAL at 12:56

## 2025-02-13 RX ADMIN — PROPOFOL 150 MG: 10 INJECTION, EMULSION INTRAVENOUS at 07:39

## 2025-02-13 RX ADMIN — HEPARIN SODIUM 38000 UNITS: 1000 INJECTION, SOLUTION INTRAVENOUS; SUBCUTANEOUS at 09:21

## 2025-02-13 RX ADMIN — DEXTROSE MONOHYDRATE 12.5 G: 25 INJECTION, SOLUTION INTRAVENOUS at 10:46

## 2025-02-13 RX ADMIN — NOREPINEPHRINE BITARTRATE 8 MCG: 1 INJECTION, SOLUTION, CONCENTRATE INTRAVENOUS at 09:12

## 2025-02-13 ASSESSMENT — ACTIVITIES OF DAILY LIVING (ADL)
FEEDING YOURSELF: INDEPENDENT
PATIENT'S MEMORY ADEQUATE TO SAFELY COMPLETE DAILY ACTIVITIES?: YES
TOILETING: INDEPENDENT
ADEQUATE_TO_COMPLETE_ADL: YES
DRESSING YOURSELF: INDEPENDENT
JUDGMENT_ADEQUATE_SAFELY_COMPLETE_DAILY_ACTIVITIES: YES
BATHING: INDEPENDENT
WALKS IN HOME: INDEPENDENT
HEARING - LEFT EAR: FUNCTIONAL
HEARING - RIGHT EAR: FUNCTIONAL
GROOMING: INDEPENDENT

## 2025-02-13 ASSESSMENT — PAIN SCALES - GENERAL
PAINLEVEL_OUTOF10: 0 - NO PAIN

## 2025-02-13 ASSESSMENT — PAIN - FUNCTIONAL ASSESSMENT
PAIN_FUNCTIONAL_ASSESSMENT: 0-10
PAIN_FUNCTIONAL_ASSESSMENT: CPOT (CRITICAL CARE PAIN OBSERVATION TOOL)
PAIN_FUNCTIONAL_ASSESSMENT: 0-10

## 2025-02-13 ASSESSMENT — COLUMBIA-SUICIDE SEVERITY RATING SCALE - C-SSRS
6. HAVE YOU EVER DONE ANYTHING, STARTED TO DO ANYTHING, OR PREPARED TO DO ANYTHING TO END YOUR LIFE?: NO
1. IN THE PAST MONTH, HAVE YOU WISHED YOU WERE DEAD OR WISHED YOU COULD GO TO SLEEP AND NOT WAKE UP?: NO
2. HAVE YOU ACTUALLY HAD ANY THOUGHTS OF KILLING YOURSELF?: NO

## 2025-02-13 NOTE — H&P
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 Valdes 2/13/25.     Cardiac Testing:   VIDHI 10/15/24:  Summary:  Mitral valve: Flail motion involving the middle scallop of posterior leaflet due to rupture of one or more chords. There is severe regurgitation, directed eccentrically and anteriorly. Severe regurgitation is suggested by an effective regurgitant orifice >/= 0.40 cm^2, a regurgitant volume >/= 60cc, and pulmonary vein systolic flow reversal.  Left ventricle: Systolic fxn normal  Left Atrium: There is no evidence of a thrombus in the atrial cavity or appendage  Right atrium: There is no evidence of a thrombus in the atrial cavity or appendage  Atrial septum: There is no atrial level shunt.    [facility] 10/30/24:  Summary:  Condition 1-PA wedge pressure=26/25 (20) mm Hg. LV presssure=92/30, 12 mm Hg. dP/ec=5191 mm Hg/s  Mitral valve: There is severe regurgitation  Left ventricle: Systolic function is normal  Impressions:  No significant coronary artery disease is identified  Severe MR.    Procedure/Surgeon: Mini right thoracotomy, mitral valve repair/Dr. Valdes    Frontliner/Anesthesia: Shahzad Joya/Dr. Rivers  Out of OR Time (document on ventilator card): 1154     OR Course/Issues: N/A     CPB time: 79  Cross clamp time: 43  Echo Pre/Post: Normal BiV fxn, severe MR pre and resolved post  Chest Tubes/Drains: R pleural x1   Temporary wires location/setting: N/A      Fluids  Crystalloid: 1500  Colloid: 500  Cellsaver: 480  Products: 0  EBL: 250  UOP: 400     Anesthesia  Intubation: H/o difficult airway. Easy mask, MAC4 VL grade I view, easy intubation with 37F L JOHNATHAN.  Intravenous Access: RIJ MAC, L brachial Brandan jimenez   AICD: n/a  PPM: n/a  Regional anesthesia: R sided cryoablation  Benzodiazepine dose/last administration: 2mg midazolam total 726  Opioid  dose/last administration: 500mcg fentanyl total, 10mg methadone (859)  NMB dose/last administration: 50mg rocuronium 9:31  TOF/ reversal given: 12:08  Antibiotic time: 833  Temperature on admission to ICU: 35.9    Past Medical History:   Diagnosis Date    Basal cell carcinoma     Hiatal hernia     Hypertension     Kidney stone 10/17/2023    follows with urology    Nephrolithiasis     Severe mitral regurgitation     Viral URI 01/07/2025    patient states that he has a dry, NP cough the last 5 weeks     Past Surgical History:   Procedure Laterality Date    CARDIAC CATHETERIZATION  10/30/2024    COLONOSCOPY      KIDNEY STONE SURGERY       Medications Prior to Admission   Medication Sig Dispense Refill Last Dose/Taking    ascorbic acid (Vitamin C) 1,000 mg tablet Take 1 tablet (1,000 mg) by mouth once daily.   Past Week    chlorhexidine (Hibiclens) 4 % external liquid Use as directed daily preoperatively 473 mL 0 2/13/2025 Morning    chlorhexidine (Peridex) 0.12 % solution Swish and spit with 15ml of solution the night before and morning of surgery. Do not swallow. 15 mL 0 2/13/2025 Morning    lisinopril 20 mg tablet Take 1 tablet (20 mg) by mouth once daily.   Past Week     Patient has no known allergies.  Social History     Tobacco Use    Smoking status: Never    Smokeless tobacco: Never   Vaping Use    Vaping status: Never Used   Substance Use Topics    Alcohol use: Yes     Comment: Ocassionally    Drug use: Never     No family history on file.    Review of Systems:  Intubated and sedated    Objective   Vitals:  Most Recent:  Vitals:    02/13/25 1210   BP:    Pulse: 69   Resp: 16   Temp:    SpO2: 100%       24hr Min/Max:  Temp  Min: 36.1 °C (97 °F)  Max: 36.1 °C (97 °F)  Pulse  Min: 55  Max: 77  BP  Min: 175/95  Max: 175/95  Resp  Min: 15  Max: 20  SpO2  Min: 97 %  Max: 100 %    I/O:  No intake/output data recorded.    LDA:  CVC 02/13/25 Double lumen Right Internal jugular (Active)   Placement Date/Time: 02/13/25 (c)  0805   Hand Hygiene Performed Prior to CVC Insertion: Yes  Site Prep: Chlorhexidine   Site Prep Agent has Completely Dried Before Insertion: Yes  All 5 Sterile Barriers Used (Gloves, Gown, Cap, Mask, Large Sterile Cindi...   Number of days: 0       ETT  37 Fr (Active)   Placement Date/Time: 02/13/25 (c) 0745   Mask Ventilation: Vent by mask  Technique: Video laryngoscopy  ETT Type: ETT - double lumen left  Double Lumen Tube Size: 37 Fr  Cuffed: Yes  Laryngoscope: Suman  Blade Size: 4  Location: Oral  Grade View: ...   Number of days: 0       Urethral Catheter Non-latex;Temperature probe 14 Fr. (Active)   Placement Date/Time: 02/13/25 0742   Placed by: MINH Jessica  Hand Hygiene Completed: Yes  Catheter Type: Non-latex;Temperature probe  Tube Size (Fr.): 14 Fr.  Catheter Balloon Size: 10 mL  Urine Returned: Yes   Number of days: 0     Physical Exam  Constitutional:       Appearance: Normal appearance.      Comments: Intubated/sedated in NAD   HENT:      Head: Normocephalic.      Nose: Nose normal.      Mouth/Throat:      Mouth: Mucous membranes are moist.   Eyes:      Pupils: Pupils are equal, round, and reactive to light.   Neck:      Comments: RIJ MAC and minimac. Initial CVC attempt site just inferior to line sutured, c/d/I, soft, no hematoma.   Cardiovascular:      Rate and Rhythm: Normal rate and regular rhythm.      Pulses: Normal pulses.      Heart sounds: Normal heart sounds.   Pulmonary:      Breath sounds: Normal breath sounds.      Comments: R pleural chest tube in place draining minimal serosang. No airleak in atrium  Abdominal:      General: Abdomen is flat.      Palpations: Abdomen is soft.      Comments: Reducible inguinal hernia   Musculoskeletal:         General: No swelling.      Cervical back: Normal range of motion and neck supple.   Skin:     General: Skin is warm and dry.   Neurological:      General: No focal deficit present.           Lab Review:            No lab exists for component:  "\"LABALBU\"      Results from last 7 days   Lab Units 02/13/25  1213   POCT PH, ARTERIAL pH 7.32*   POCT PCO2, ARTERIAL mm Hg 44*   POCT PO2, ARTERIAL mm Hg 117*   POCT HCO3 CALCULATED, ARTERIAL mmol/L 22.7   POCT BASE EXCESS, ARTERIAL mmol/L -3.4*       Most recent labs and imaging reviewed.    Daily Risk Screen  Intubated: Yes, will reassess for extubation following routine postop labs  Central line: Yes, maintain for hemodynamic monitoring  Hummel: Yes, maintain while intubated/sedated for strict I/O measurement.    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 Vadles 2/13/25.      Plan:  NEURO:  No significant PMHx. Patient is intubated and sedated on propofol infusion. Acute post operative pain, received R sided cryo-analgesia by surgical team. 10mg methadone, 1g Tylenol  -->  - Serial neuro and pain assessments   - Continue propofol gtt for now, pending routine postop labs. Daily sedation vacation at minimum   - Scheduled Tylenol 975mg q8hr   - PRN oxycodone  - PRN dilaudid for pain   - PT Consult, OOB to chair as tolerated, chair position if not tolerated   - CAM ICU score qshift  - Sleep/wake cycle hygiene     CV:  Patient has a history of HTN, severe MR Now s/p mini right thoracotomy, mitral valve repair with Dr. Valdes. Pre/Post EF: Normal BiV, severe MR pre/resolved post. Arrived to CTICU off all vasoactive support. No epicardial wires placed->  - Maintain goal MAP 70-90  - Volume resuscitate as clinically indicated  - Start ASA/statin tomorrow  - Hold home lisinopril     PULM:  No history of pulmonary disease.  Currently intubated on ventilator. Post operative atelectasis. Chest tubes R pleural x1 to wall suction. 39F JOHNATHAN in place, bronchial balloon deflated -->  - F/u post op CXR  - Already reversed, wean ventilator settings towards CPAP & extubation once " hemodynamically stable   - Wean FiO2 maintaining SpO2 >92%.   - IS q1h and OOB to chair when extubated  - Chest tubes to wall suction.    GI:  No significant history. OG in place.-->  - Continue PPI until extubated  - NPO, will perform bedside swallow eval post extubation   - Colace/senna BID and miralax BID      : PMHx chronic nephrolithiasis. CSA-KEANU Risk Score 3.  No history of renal disease, baseline CRE 0.9-1. Creatinine stable post-op. Dotson in place and making adequate UOP. -->  - Continue dotson catheter for strict I/Os.  - Goal UOP 0.5ml/kg/hr  - RFP as clinically indicated  - Replete electrolytes per CTICU protocol      Variable Points   Male 1   Age < 40 0   Age 41-60 1   Age 61-80 2   Age > 81 3   CKD 1   NYHA > 2 1   Previous cardiac Surgery 1     Points Pre-Op ICU Admit   0-1 Low Low   2-3 Medium Low   4 High Low   5-9 High Medium   > 10 High High        ENDO:  No significant PMHx -->  - Maintain BG <180, insulin per CTICU protocol     HEME:  Acute blood loss anemia and thrombocytopenia. -->    - Monitor drain output volume and characteristics  - CBC, coags, and fibrinogen post op and as clinically indicated  - SQ tomorrow   - SCDs for DVT prophylaxis.  - Last type and screen: 2/13     ID:  Afebrile, no current indications of infection. Neg MRSA. -->  - Trend temp q4h  - Periop cefazolin x 48hrs     Skin:  No active skin issues.  - preventative Mepilex dressings in place on sacrum and heels  - change preventative Mepilex weekly or more frequently as indicated (when moist/soiled)   - every shift skin assessment per nursing and weekly ICU skin rounds  - moisture barrier to be applied with indio care  - active skin problems addressed with nursing on daily rounds     Proph:  SCDs  PPI     G:  Line  Right IJ MAC w Minimac placed 2/13  Left brachial a-line placed 2/13  PIVs    F: Family: will update at bedside postoperatively.     A,B,C,D,E,F,G: reviewed     02/13/25 at 12:35 PM - Josesito Vázquez,  MD  Dispo: CTICU care for now.    CTICU TEAM PHONE [phone]

## 2025-02-13 NOTE — ANESTHESIA PROCEDURE NOTES
Airway  Date/Time: 2/13/2025 7:45 AM  Urgency: elective    Airway not difficult    Staffing  Performed: MADIHA, CRNA and fellow   Authorized by: Randall Rivers MD    Performed by: Jessenia Wise  Patient location during procedure: OR    Indications and Patient Condition  Indications for airway management: anesthesia  Spontaneous Ventilation: absent  Sedation level: deep  Preoxygenated: yes  Patient position: sniffing  Mask difficulty assessment: 1 - vent by mask    Final Airway Details  Final airway type: endotracheal airway      Successful airway: ETT - double lumen left  Cuffed: yes   Successful intubation technique: video laryngoscopy  Facilitating devices/methods: intubating stylet  Endotracheal tube insertion site: oral  Blade: Suman  Blade size: #4  ETT DL size (fr): 37  Cormack-Lehane Classification: grade I - full view of glottis  Placement verified by: bronchoscopy and capnometry   Measured from: lips  ETT to lips (cm): 29  Number of attempts at approach: 1

## 2025-02-13 NOTE — CARE PLAN
Problem: Safety - Medical Restraint  Goal: Remains free of injury from restraints (Restraint for Interference with Medical Device)  2/13/2025 1753 by Soco Rivera RN  Outcome: Progressing  2/13/2025 1753 by Soco Rivera RN  Outcome: Progressing  Goal: Free from restraint(s) (Restraint for Interference with Medical Device)  2/13/2025 1753 by Soco Rivera RN  Outcome: Progressing  2/13/2025 1753 by Soco Rivera RN  Outcome: Progressing     Problem: Pain - Adult  Goal: Verbalizes/displays adequate comfort level or baseline comfort level  Outcome: Progressing     Problem: Safety - Adult  Goal: Free from fall injury  Outcome: Progressing     Problem: Discharge Planning  Goal: Discharge to home or other facility with appropriate resources  Outcome: Progressing     Problem: Chronic Conditions and Co-morbidities  Goal: Patient's chronic conditions and co-morbidity symptoms are monitored and maintained or improved  Outcome: Progressing     Problem: Nutrition  Goal: Nutrient intake appropriate for maintaining nutritional needs  Outcome: Progressing     Problem: Skin  Goal: Decreased wound size/increased tissue granulation at next dressing change  Outcome: Progressing  Goal: Participates in plan/prevention/treatment measures  Outcome: Progressing  Goal: Prevent/manage excess moisture  Outcome: Progressing  Goal: Prevent/minimize sheer/friction injuries  Outcome: Progressing  Goal: Promote/optimize nutrition  Outcome: Progressing  Goal: Promote skin healing  Outcome: Progressing     Problem: Fall/Injury  Goal: Not fall by end of shift  Outcome: Progressing  Goal: Be free from injury by end of the shift  Outcome: Progressing  Goal: Verbalize understanding of personal risk factors for fall in the hospital  Outcome: Progressing  Goal: Verbalize understanding of risk factor reduction measures to prevent injury from fall in the home  Outcome: Progressing  Goal: Use assistive devices by end of the shift  Outcome: Progressing  Goal: Pace activities to  prevent fatigue by end of the shift  Outcome: Progressing   The patient's goals for the shift include recovery and extubation      The clinical goals for the shift include recovery and extubation     Over the shift, the patient did not make progress toward the following goals. Barriers to progression include. Recommendations to address these barriers include.

## 2025-02-13 NOTE — BRIEF OP NOTE
Right Mini Thoracotomy Mitral Valve Repair with a 36 mm SimuPlus Band  Right Sided Intercostal Cryo-Analgesia  Right Groin Cutdown for Right Femoral Artery and Right Femoral Vein Cannulation  Primary Repair of Right Femoral Artery    Chest Tubes/Drains: Chest tube x 1 (One Right Pleural)      Right Thoracotomy Closure: Gage LOMAX  Right Groin Closure: Jaskaran BARNETT    Cardio Pulmonary Bypass Time: 79 minutes  Cross-clamp Time:  43 minutes  Circulatory Arrest: No     Date: 2025  OR Location: [facility] OR    Name: Rigo Monet, : 1960, Age: 64 y.o., MRN: [id number], Sex: male    Diagnosis  Pre-op Diagnosis      * Mitral valve insufficiency, unspecified etiology [I34.0] Post-op Diagnosis     * Mitral valve insufficiency, unspecified etiology [I34.0]     Procedures  Mini Right Thoracotomy, Mitral Valve Repair 36 Simuplus Band, Right Femoral Arterial and Venous Cannulation, Intercostal Cryo Analgesia  29589 - OK VLVP MITRAL VALVE W/CARD BYP W/PROSTC RING      Surgeons      * Denilson Valdes - Primary    Resident/Fellow/Other Assistant:  Gage BARNETT    Staff:   Circulator: Lanette  Scrub Person: Kristi  Scrub Person: Alexsander  Surgical Assistant: Anne  Surgical Assistant: Ignacio  Surgical Assistant: Michael    Anesthesia Staff: Anesthesiologist: Randall Rivers MD  CRNA: SARI Villa  SRNA: Jessenia Wise  Perfusionist: Pavel Jackson  Frontline Breaker: SARI Sanon    Procedure Summary  Anesthesia: General  ASA: III  Estimated Blood Loss: 250 mL  Intra-op Medications:   Administrations occurring from 0645 to 1230 on 25:   Medication Name Total Dose   sodium chloride 0.9 % irrigation solution 4,000 mL   doxycycline (Vibramycin) injection 100 mg   sterile water injection 10 mL   acetaminophen (Ofirmev) injection 1,000 mg   calcium chloride 100 mg/mL syringe 1 g   ceFAZolin (Ancef) vial 1 g 2 g   dextrose 50% in water IV syringe 25 g   electrolyte-A  (Plasmalyte-A) Cannot be calculated   fentaNYL (Sublimaze) injection 50 mcg/mL 500 mcg   heparin 1,000 units/mL 38,000 Units   insulin regular (HumuLIN R) injection 10 Units   lidocaine (Xylocaine) injection 2 % 100 mg   magnesium sulfate 50 % injection 2 g   methadone (Dolophine) injection 10 mg   midazolam PF (Versed) injection 1 mg/mL 2 mg   nitroglycerin 100 mcg/mL D5W intracoronary syringe - compounded 50 mcg   norepinephrine (Levophed) 8 mg in sodium chloride 0.9% 250 mL (0.032 mg/mL) infusion (premix) 0.08 mg   norepinephrine (Levophed) 16 mcg/mL syringe for Anesthesia 40 mcg   propofol (Diprivan) injection 10 mg/mL 405.55 mg   protamine injection 340 mg   rocuronium (ZeMuron) 50 mg/5 mL injection 200 mg   NaCl 0.9 % infusion 79 mL   tranexamic acid (Cyklokapron) 6,250 mg in sodium chloride 0.9% 312.5 mL (20 mg/mL) infusion 2,184.6 mg              Anesthesia Record               Intraprocedure I/O Totals          Intake    Norepinephrine Drip 0.00 mL    The total shown is the total volume documented since Anesthesia Start was filed.    Tranexamic Acid 0.00 mL    The total shown is the total volume documented since Anesthesia Start was filed.    Cell Saver 484 mL    Total Intake 484 mL       Output    Urine 305 mL    Total Output 305 mL       Net    Net Volume 179 mL          Specimen:   ID Type Source Tests Collected by Time   1 : Posterior Leaflet Tissue HEART VALVE - MITRAL SURGICAL PATHOLOGY EXAM Denilson Valdes MD 2/13/2025 0948                  Findings: Mitral Regurgitation     Complications:  None; patient tolerated the procedure well.     Disposition: ICU - intubated and hemodynamically stable.  Condition: stable  Specimens Collected:   ID Type Source Tests Collected by Time   1 : Posterior Leaflet Tissue HEART VALVE - MITRAL SURGICAL PATHOLOGY EXAM Denilson Valdes MD 2/13/2025 0948     Attending Attestation: I was present and scrubbed for the entire procedure.    Denilson Valdes  Phone Number:  [phone]

## 2025-02-13 NOTE — ANESTHESIA PROCEDURE NOTES
Peripheral IV  Date/Time: 2/13/2025 7:30 AM  Inserted by: DENISE Villa-ELMER    Placement  Needle size: 14 G  Laterality: right  Location: hand  Local anesthetic: none  Site prep: alcohol  Technique: anatomical landmarks  Attempts: 1

## 2025-02-13 NOTE — ANESTHESIA PROCEDURE NOTES
Arterial Line:    Date/Time: 2/13/2025 7:28 AM    Staffing  Performed: CRNA and MADIHA   Authorized by: Randall Rivers MD    Performed by: Jessenia Wise    An arterial line was placed. Procedure performed using ultrasound guidance.in the OR for the following indication(s): continuous blood pressure monitoring and blood sampling needed.    A 20 gauge (size) (length), Angiocath (type) catheter was placed into the Left brachial artery, secured by Tegaderm,   Seldinger technique used.  Events:  patient tolerated procedure well with no complications.

## 2025-02-13 NOTE — ANESTHESIA PROCEDURE NOTES
Central Venous Line:    Date/Time: 2/13/2025 8:05 AM    A central venous line was placed in the OR for the following indication(s): central venous access and CVP monitoring.  Staffing  Performed: SRNA and CRNA   Authorized by: Randall Rivers MD    Performed by: Jsesenia Wise    Sterility preparation included the following: provider hand hygiene performed prior to central venous catheter insertion, all 5 sterile barriers used (gloves, gown, cap, mask, large sterile drape) during central venous catheter insertion, antiseptic used during central venous catheter insertion and skin prep agent completely dried prior to procedure.  Medical reason for not performing maximal sterile barrier technique: no  The patient was placed in Trendelenburg position.    Right internal jugular vein was prepped.    The site was prepped with Chlorhexidine.  Size: 9 Fr (8 Fr)   Length: 11.5  Catheter type: introducer   Number of Lumens: double lumen    This catheter was not an oximetric catheter.    During the procedure, the following specific steps were taken: target vein identified, needle advanced into vein and blood aspirated and guidewire advanced into vein.  Seldinger technique used.  Procedure performed using ultrasound guidance.  Sterile gel and probe cover used in ultrasound-guided central venous catheter insertion.    Intravenous verification was obtained by ultrasound, venous blood return and manometry.      Post insertion care included: all ports aspirated, all ports flushed easily, guidewire removed intact, Biopatch applied, line sutured in place and dressing applied.    During the procedure the patient experienced: patient tolerated procedure well with no complications.           images stored in chart    Additional notes:  First attempt of RIJ using ultrasound with venous blood return and confirmation using manometry and ultrasound to place guidewire into vein. Difficulty for SRNA and CRNA to advance dilator through soft  tissue beneath skin despite several scalpel incisions at insertion site without skin tag. Wire still freely mobile and intact. Dr. Rivers requested to abort procedure and remove wire/dilator/catheter and hold pressure. No swelling or significant bleeding. Insertion site stitched by ELMER Rivers attempted second time, now higher up on the neck (more cranial direction) and easily placed central line in usual step-wise fashion without resistance or complications. Confirmation with manometry in addition to ultrasound. Venous blood return from all lumens. Sutured, biopatch and tegaderm by CRNA

## 2025-02-13 NOTE — OP NOTE
Mini Right Thoracotomy, Mitral Valve Repair 36 Simuplus Band, Right Femoral Arterial and Venous Cannulation, Intercostal Cryo Analgesia (R) Operative Note     Date: 2025  OR Location: [facility] OR    Name: Rigo Monet, : 1960, Age: 64 y.o., MRN: [id number], Sex: male    Diagnosis  Pre-op Diagnosis      * Mitral valve insufficiency, unspecified etiology [I34.0] Post-op Diagnosis     * Mitral valve insufficiency, unspecified etiology [I34.0]     Procedures  Mini Right Thoracotomy, Mitral Valve Repair 36 Simuplus Band, Right Femoral Arterial and Venous Cannulation, Intercostal Cryo Analgesia  93409 - CT VLVP MITRAL VALVE W/CARD BYP W/PROSTC RING      Surgeons      * Denilson Valdes - Primary    Resident/Fellow/Other Assistant:  Surgeons and Role:  * No surgeons found with a matching role *    Staff:   Moisesulator: Lanette  Scrub Person: Kristi  Scrub Person: Alexsander  Surgical Assistant: Anne  Surgical Assistant: Ignacio  Surgical Assistant: Michael    Anesthesia Staff: Anesthesiologist: Randall Rivers MD  CRNA: DENISE Villa-ELMER  SRNA: Jessenia Wise  Perfusionist: Pavel Jackson  Frontline Breaker: SARI Sanon    Procedure Summary  Anesthesia: General  ASA: III  Estimated Blood Loss: 250 mL  Intra-op Medications:   Administrations occurring from 0645 to 1230 on 25:   Medication Name Total Dose   sodium chloride 0.9 % irrigation solution 4,000 mL   doxycycline (Vibramycin) injection 100 mg   sterile water injection 10 mL   acetaminophen (Ofirmev) injection 1,000 mg   calcium chloride 100 mg/mL syringe 1 g   ceFAZolin (Ancef) vial 1 g 2 g   dextrose 50% in water IV syringe 25 g   electrolyte-A (Plasmalyte-A) Cannot be calculated   fentaNYL (Sublimaze) injection 50 mcg/mL 500 mcg   heparin 1,000 units/mL 38,000 Units   insulin regular (HumuLIN R) injection 10 Units   lidocaine (Xylocaine) injection 2 % 100 mg   magnesium sulfate 50 % injection 2 g   methadone (Dolophine)  injection 10 mg   midazolam PF (Versed) injection 1 mg/mL 2 mg   nitroglycerin 100 mcg/mL D5W intracoronary syringe - compounded 50 mcg   norepinephrine (Levophed) 8 mg in sodium chloride 0.9% 250 mL (0.032 mg/mL) infusion (premix) 0.33 mg   norepinephrine (Levophed) 16 mcg/mL syringe for Anesthesia 40 mcg   propofol (Diprivan) injection 10 mg/mL 405.55 mg   protamine injection 340 mg   rocuronium (ZeMuron) 50 mg/5 mL injection 200 mg   NaCl 0.9 % infusion 79 mL   tranexamic acid (Cyklokapron) 6,250 mg in sodium chloride 0.9% 312.5 mL (20 mg/mL) infusion 2,184.6 mg              Anesthesia Record               Intraprocedure I/O Totals          Intake    Norepinephrine Drip 0.00 mL    The total shown is the total volume documented since Anesthesia Start was filed.    Tranexamic Acid 0.00 mL    The total shown is the total volume documented since Anesthesia Start was filed.    Cell Saver 240 mL    Total Intake 240 mL       Output    Urine 305 mL    Total Output 305 mL       Net    Net Volume -65 mL          Specimen:   ID Type Source Tests Collected by Time   1 : Posterior Leaflet Tissue HEART VALVE - MITRAL SURGICAL PATHOLOGY EXAM Denilson Valdes MD 2/13/2025 0948                 Drains and/or Catheters:   Urethral Catheter Non-latex;Temperature probe 14 Fr. (Active)       Tourniquet Times:         Implants:  Implants       Type Name Action Serial No.      Other Cardiac Implant BAND, 36MM, SIMUPLUS FLEXIBLE - [id number] - [id number] Implanted [id number]              Findings: There were no pericardial adhesions or effusions.  The ascending aorta was soft without evidence of atherosclerosis.  The mitral valve regurgitation was secondary to a flail middle segment of the posterior mitral valve leaflet.  This was secondary to ruptured cordinae tendonae.  The remaining segments of the posterior mitral valve leaflet appeared normal.    Indications: Rigo Monet is an 64 y.o. male who is having surgery for Mitral valve  insufficiency, unspecified etiology [I34.0].  Echocardiography demonstrates severe mitral regurgitation secondary to a flail middle segment of the posterior mitral valve leaflet.  Ventricular function was felt to be preserved.  Coronary angiography did not demonstrate any significant.  The patient was referred for mitral valve    Procedure Details: The right common femoral artery and vein were dissected out and prepared for cannulation.  The patient was heparinized the right common femoral artery and vein were cannulated and the venous cannula was positioned such that the tip glide in the SVC.  A 7 cm right lateral thoracotomy was performed over the fourth intercostal space.  The pericardium was opened several centimeters anterior to the.  The patient was placed on cardiopulmonary bypass and antegrade cardioplegia cannula was placed in the ascending.  The ascending aorta was crossclamped and the heart was arrested and protected with cold blood cardioplegia.  A left atriotomy was performed and retractors were placed exposing the mitral valve.  The flail segment of the posterior mitral valve leaflet was excised in a triangular fashion.  The posterior leaflet was then approximated to itself with 2 layers of running 5-0 Prolene suture.  A posterior annuloplasty was then performed using a 36 mm annuloplasty band it was sutured in place with interrupted 2-0 sutures.  The valve was then tested and it appeared to be competent.  The left atriotomy was then closed with a running 3-0 Prolene stitch.  The heart and ascending aorta were de-aired and aortic cross-clamp was removed when the patient's heart was completely de-aired and recovered, he was weaned from cardiopulmonary bypass without difficulty.  Intraoperative transesophageal echocardiography demonstrated similar ventricular function and a well-functioning repaired mitral valve without any insufficiency.  All cannulas were removed and the heparin effect was reversed  with protamine.  The femoral vessels were repaired.  Hemostasis was obtained, a right pleural chest tube was placed, wounds were closed in the standard fashion.  The patient tolerated the procedure well and was brought to the intensive care unit in stable condition.  To sponge counts, instrument counts, and needle counts reported correct by the circulating nurse.  The specimen sent to pathology included the excised flail segment of the posterior mitral valve leaflet.  The drains included a right pleural chest tube.  The estimated blood loss was 250 cc.  I performed the procedure.  Complications:  None    Disposition: ICU - intubated and hemodynamically stable.  Condition: stable         Task Performed by RNFA or Surgical Assistant:  The RNFA assisted throughout the procedure and closed the femoral artery and vein cannulation site as well as the minithoracotomy incision.          Additional Details: None    Attending Attestation: I performed the procedure.    Denilson Valdes  Phone Number: [phone]

## 2025-02-14 ENCOUNTER — APPOINTMENT (OUTPATIENT)
Dept: RADIOLOGY | Facility: HOSPITAL | Age: 65
DRG: 219 | End: 2025-02-14
Payer: COMMERCIAL

## 2025-02-14 LAB
ALBUMIN SERPL BCP-MCNC: 3.9 G/DL (ref 3.4–5)
ANION GAP BLDA CALCULATED.4IONS-SCNC: 10 MMO/L (ref 10–25)
ANION GAP SERPL CALC-SCNC: 11 MMOL/L (ref 10–20)
ATRIAL RATE: 57 BPM
BASE EXCESS BLDA CALC-SCNC: 1.7 MMOL/L (ref -2–3)
BODY TEMPERATURE: 37 DEGREES CELSIUS
BUN SERPL-MCNC: 18 MG/DL (ref 6–23)
CA-I BLD-SCNC: 1.2 MMOL/L (ref 1.1–1.33)
CA-I BLDA-SCNC: 1.2 MMOL/L (ref 1.1–1.33)
CALCIUM SERPL-MCNC: 8.7 MG/DL (ref 8.6–10.6)
CHLORIDE BLDA-SCNC: 107 MMOL/L (ref 98–107)
CHLORIDE SERPL-SCNC: 106 MMOL/L (ref 98–107)
CO2 SERPL-SCNC: 27 MMOL/L (ref 21–32)
CREAT SERPL-MCNC: 0.91 MG/DL (ref 0.5–1.3)
EGFRCR SERPLBLD CKD-EPI 2021: >90 ML/MIN/1.73M*2
ERYTHROCYTE [DISTWIDTH] IN BLOOD BY AUTOMATED COUNT: 13.4 % (ref 11.5–14.5)
GLUCOSE BLD MANUAL STRIP-MCNC: 137 MG/DL (ref 74–99)
GLUCOSE BLD MANUAL STRIP-MCNC: 141 MG/DL (ref 74–99)
GLUCOSE BLD MANUAL STRIP-MCNC: 163 MG/DL (ref 74–99)
GLUCOSE BLD MANUAL STRIP-MCNC: 166 MG/DL (ref 74–99)
GLUCOSE BLD MANUAL STRIP-MCNC: 172 MG/DL (ref 74–99)
GLUCOSE BLDA-MCNC: 136 MG/DL (ref 74–99)
GLUCOSE SERPL-MCNC: 136 MG/DL (ref 74–99)
HCO3 BLDA-SCNC: 26.6 MMOL/L (ref 22–26)
HCT VFR BLD AUTO: 37.1 % (ref 41–52)
HCT VFR BLD EST: 38 % (ref 41–52)
HGB BLD-MCNC: 12.5 G/DL (ref 13.5–17.5)
HGB BLDA-MCNC: 12.8 G/DL (ref 13.5–17.5)
LACTATE BLDA-SCNC: 0.8 MMOL/L (ref 0.4–2)
MAGNESIUM SERPL-MCNC: 2.29 MG/DL (ref 1.6–2.4)
MCH RBC QN AUTO: 30.2 PG (ref 26–34)
MCHC RBC AUTO-ENTMCNC: 33.7 G/DL (ref 32–36)
MCV RBC AUTO: 90 FL (ref 80–100)
NRBC BLD-RTO: 0 /100 WBCS (ref 0–0)
OXYHGB MFR BLDA: 95.7 % (ref 94–98)
P AXIS: 46 DEGREES
P OFFSET: 201 MS
P ONSET: 146 MS
PCO2 BLDA: 42 MM HG (ref 38–42)
PH BLDA: 7.41 PH (ref 7.38–7.42)
PHOSPHATE SERPL-MCNC: 2.4 MG/DL (ref 2.5–4.9)
PLATELET # BLD AUTO: 136 X10*3/UL (ref 150–450)
PO2 BLDA: 84 MM HG (ref 85–95)
POTASSIUM BLDA-SCNC: 4.1 MMOL/L (ref 3.5–5.3)
POTASSIUM SERPL-SCNC: 4.1 MMOL/L (ref 3.5–5.3)
PR INTERVAL: 148 MS
Q ONSET: 220 MS
QRS COUNT: 10 BEATS
QRS DURATION: 92 MS
QT INTERVAL: 440 MS
QTC CALCULATION(BAZETT): 428 MS
QTC FREDERICIA: 432 MS
R AXIS: 30 DEGREES
RBC # BLD AUTO: 4.14 X10*6/UL (ref 4.5–5.9)
SAO2 % BLDA: 98 % (ref 94–100)
SODIUM BLDA-SCNC: 139 MMOL/L (ref 136–145)
SODIUM SERPL-SCNC: 140 MMOL/L (ref 136–145)
T AXIS: 8 DEGREES
T OFFSET: 440 MS
VENTRICULAR RATE: 57 BPM
WBC # BLD AUTO: 12.2 X10*3/UL (ref 4.4–11.3)

## 2025-02-14 PROCEDURE — 85027 COMPLETE CBC AUTOMATED: CPT

## 2025-02-14 PROCEDURE — 82947 ASSAY GLUCOSE BLOOD QUANT: CPT

## 2025-02-14 PROCEDURE — 82330 ASSAY OF CALCIUM: CPT

## 2025-02-14 PROCEDURE — 2500000002 HC RX 250 W HCPCS SELF ADMINISTERED DRUGS (ALT 637 FOR MEDICARE OP, ALT 636 FOR OP/ED): Performed by: NURSE PRACTITIONER

## 2025-02-14 PROCEDURE — 84132 ASSAY OF SERUM POTASSIUM: CPT

## 2025-02-14 PROCEDURE — 2500000004 HC RX 250 GENERAL PHARMACY W/ HCPCS (ALT 636 FOR OP/ED)

## 2025-02-14 PROCEDURE — 83735 ASSAY OF MAGNESIUM: CPT

## 2025-02-14 PROCEDURE — 2500000001 HC RX 250 WO HCPCS SELF ADMINISTERED DRUGS (ALT 637 FOR MEDICARE OP)

## 2025-02-14 PROCEDURE — 99233 SBSQ HOSP IP/OBS HIGH 50: CPT

## 2025-02-14 PROCEDURE — 94640 AIRWAY INHALATION TREATMENT: CPT

## 2025-02-14 PROCEDURE — 2500000005 HC RX 250 GENERAL PHARMACY W/O HCPCS

## 2025-02-14 PROCEDURE — 97530 THERAPEUTIC ACTIVITIES: CPT | Mod: GO

## 2025-02-14 PROCEDURE — 2500000002 HC RX 250 W HCPCS SELF ADMINISTERED DRUGS (ALT 637 FOR MEDICARE OP, ALT 636 FOR OP/ED)

## 2025-02-14 PROCEDURE — 2500000004 HC RX 250 GENERAL PHARMACY W/ HCPCS (ALT 636 FOR OP/ED): Performed by: NURSE PRACTITIONER

## 2025-02-14 PROCEDURE — 2500000001 HC RX 250 WO HCPCS SELF ADMINISTERED DRUGS (ALT 637 FOR MEDICARE OP): Performed by: NURSE PRACTITIONER

## 2025-02-14 PROCEDURE — 1200000002 HC GENERAL ROOM WITH TELEMETRY DAILY

## 2025-02-14 PROCEDURE — 37799 UNLISTED PX VASCULAR SURGERY: CPT

## 2025-02-14 PROCEDURE — 71045 X-RAY EXAM CHEST 1 VIEW: CPT | Performed by: RADIOLOGY

## 2025-02-14 PROCEDURE — 71045 X-RAY EXAM CHEST 1 VIEW: CPT

## 2025-02-14 PROCEDURE — 97165 OT EVAL LOW COMPLEX 30 MIN: CPT | Mod: GO

## 2025-02-14 PROCEDURE — 2500000005 HC RX 250 GENERAL PHARMACY W/O HCPCS: Performed by: NURSE PRACTITIONER

## 2025-02-14 PROCEDURE — 97161 PT EVAL LOW COMPLEX 20 MIN: CPT | Mod: GP

## 2025-02-14 RX ORDER — ASPIRIN 81 MG/1
81 TABLET ORAL DAILY
Status: DISCONTINUED | OUTPATIENT
Start: 2025-02-15 | End: 2025-02-18 | Stop reason: HOSPADM

## 2025-02-14 RX ORDER — HYDRALAZINE HYDROCHLORIDE 20 MG/ML
10 INJECTION INTRAMUSCULAR; INTRAVENOUS ONCE
Status: COMPLETED | OUTPATIENT
Start: 2025-02-14 | End: 2025-02-14

## 2025-02-14 RX ORDER — MULTIVIT-MIN/IRON FUM/FOLIC AC 7.5 MG-4
1 TABLET ORAL DAILY
Status: DISCONTINUED | OUTPATIENT
Start: 2025-02-15 | End: 2025-02-18 | Stop reason: HOSPADM

## 2025-02-14 RX ORDER — METOPROLOL TARTRATE 25 MG/1
12.5 TABLET, FILM COATED ORAL 2 TIMES DAILY
Status: DISCONTINUED | OUTPATIENT
Start: 2025-02-14 | End: 2025-02-15

## 2025-02-14 RX ORDER — BISACODYL 10 MG/1
10 SUPPOSITORY RECTAL DAILY PRN
Status: DISCONTINUED | OUTPATIENT
Start: 2025-02-14 | End: 2025-02-18 | Stop reason: HOSPADM

## 2025-02-14 RX ORDER — HYDROMORPHONE HYDROCHLORIDE 0.2 MG/ML
0.2 INJECTION INTRAMUSCULAR; INTRAVENOUS; SUBCUTANEOUS EVERY 2 HOUR PRN
Status: DISCONTINUED | OUTPATIENT
Start: 2025-02-14 | End: 2025-02-17

## 2025-02-14 RX ORDER — IRON POLYSACCHARIDE COMPLEX 150 MG
150 CAPSULE ORAL DAILY
Status: DISCONTINUED | OUTPATIENT
Start: 2025-02-15 | End: 2025-02-15

## 2025-02-14 RX ORDER — INSULIN LISPRO 100 [IU]/ML
0-15 INJECTION, SOLUTION INTRAVENOUS; SUBCUTANEOUS
Status: DISCONTINUED | OUTPATIENT
Start: 2025-02-14 | End: 2025-02-14

## 2025-02-14 RX ORDER — HEPARIN SODIUM 5000 [USP'U]/ML
5000 INJECTION, SOLUTION INTRAVENOUS; SUBCUTANEOUS EVERY 8 HOURS
Status: DISCONTINUED | OUTPATIENT
Start: 2025-02-14 | End: 2025-02-18 | Stop reason: HOSPADM

## 2025-02-14 RX ORDER — HYDROMORPHONE HYDROCHLORIDE 0.2 MG/ML
0.2 INJECTION INTRAMUSCULAR; INTRAVENOUS; SUBCUTANEOUS ONCE
Status: COMPLETED | OUTPATIENT
Start: 2025-02-14 | End: 2025-02-14

## 2025-02-14 RX ORDER — INSULIN LISPRO 100 [IU]/ML
0-10 INJECTION, SOLUTION INTRAVENOUS; SUBCUTANEOUS
Status: DISCONTINUED | OUTPATIENT
Start: 2025-02-14 | End: 2025-02-15

## 2025-02-14 RX ORDER — HYDRALAZINE HYDROCHLORIDE 20 MG/ML
10 INJECTION INTRAMUSCULAR; INTRAVENOUS EVERY 6 HOURS
Status: DISCONTINUED | OUTPATIENT
Start: 2025-02-14 | End: 2025-02-14

## 2025-02-14 RX ADMIN — HYDROMORPHONE HYDROCHLORIDE 0.2 MG: 0.2 INJECTION, SOLUTION INTRAMUSCULAR; INTRAVENOUS; SUBCUTANEOUS at 07:41

## 2025-02-14 RX ADMIN — OXYCODONE 5 MG: 5 TABLET ORAL at 16:59

## 2025-02-14 RX ADMIN — CEFAZOLIN SODIUM 2 G: 2 INJECTION, SOLUTION INTRAVENOUS at 00:08

## 2025-02-14 RX ADMIN — HYDROMORPHONE HYDROCHLORIDE 0.2 MG: 0.2 INJECTION, SOLUTION INTRAMUSCULAR; INTRAVENOUS; SUBCUTANEOUS at 09:32

## 2025-02-14 RX ADMIN — ACETAMINOPHEN 650 MG: 325 TABLET ORAL at 05:26

## 2025-02-14 RX ADMIN — ASPIRIN 81 MG: 81 TABLET, CHEWABLE ORAL at 08:07

## 2025-02-14 RX ADMIN — OXYCODONE 5 MG: 5 TABLET ORAL at 12:57

## 2025-02-14 RX ADMIN — CEFAZOLIN SODIUM 2 G: 2 INJECTION, SOLUTION INTRAVENOUS at 16:59

## 2025-02-14 RX ADMIN — CEFAZOLIN SODIUM 2 G: 2 INJECTION, SOLUTION INTRAVENOUS at 07:41

## 2025-02-14 RX ADMIN — ONDANSETRON 4 MG: 2 INJECTION INTRAMUSCULAR; INTRAVENOUS at 18:34

## 2025-02-14 RX ADMIN — POLYETHYLENE GLYCOL 3350 17 G: 17 POWDER, FOR SOLUTION ORAL at 08:07

## 2025-02-14 RX ADMIN — ONDANSETRON 4 MG: 2 INJECTION INTRAMUSCULAR; INTRAVENOUS at 01:13

## 2025-02-14 RX ADMIN — INSULIN LISPRO 5 UNITS: 100 INJECTION, SOLUTION INTRAVENOUS; SUBCUTANEOUS at 10:01

## 2025-02-14 RX ADMIN — ACETAMINOPHEN 650 MG: 325 TABLET ORAL at 16:59

## 2025-02-14 RX ADMIN — METOPROLOL TARTRATE 12.5 MG: 25 TABLET, FILM COATED ORAL at 21:35

## 2025-02-14 RX ADMIN — ACETAMINOPHEN 650 MG: 325 TABLET ORAL at 10:01

## 2025-02-14 RX ADMIN — OXYCODONE 10 MG: 5 TABLET ORAL at 04:01

## 2025-02-14 RX ADMIN — HEPARIN SODIUM 5000 UNITS: 5000 INJECTION, SOLUTION INTRAVENOUS; SUBCUTANEOUS at 09:33

## 2025-02-14 RX ADMIN — METOPROLOL TARTRATE 12.5 MG: 25 TABLET, FILM COATED ORAL at 12:57

## 2025-02-14 RX ADMIN — OXYCODONE 10 MG: 5 TABLET ORAL at 08:10

## 2025-02-14 RX ADMIN — SENNOSIDES AND DOCUSATE SODIUM 2 TABLET: 50; 8.6 TABLET ORAL at 21:35

## 2025-02-14 RX ADMIN — HYDRALAZINE HYDROCHLORIDE 10 MG: 20 INJECTION INTRAMUSCULAR; INTRAVENOUS at 03:11

## 2025-02-14 RX ADMIN — OXYCODONE 5 MG: 5 TABLET ORAL at 21:35

## 2025-02-14 RX ADMIN — POLYETHYLENE GLYCOL 3350 17 G: 17 POWDER, FOR SOLUTION ORAL at 21:37

## 2025-02-14 RX ADMIN — HEPARIN SODIUM 5000 UNITS: 5000 INJECTION, SOLUTION INTRAVENOUS; SUBCUTANEOUS at 16:59

## 2025-02-14 RX ADMIN — Medication 4 L/MIN: at 08:00

## 2025-02-14 RX ADMIN — SENNOSIDES AND DOCUSATE SODIUM 2 TABLET: 50; 8.6 TABLET ORAL at 08:07

## 2025-02-14 RX ADMIN — ONDANSETRON 4 MG: 2 INJECTION INTRAMUSCULAR; INTRAVENOUS at 09:35

## 2025-02-14 RX ADMIN — Medication 2 L/MIN: at 17:00

## 2025-02-14 RX ADMIN — ACETAMINOPHEN 650 MG: 325 TABLET ORAL at 21:34

## 2025-02-14 RX ADMIN — INSULIN LISPRO 2 UNITS: 100 INJECTION, SOLUTION INTRAVENOUS; SUBCUTANEOUS at 18:27

## 2025-02-14 RX ADMIN — HYDRALAZINE HYDROCHLORIDE 10 MG: 20 INJECTION INTRAMUSCULAR; INTRAVENOUS at 07:42

## 2025-02-14 SDOH — ECONOMIC STABILITY: INCOME INSECURITY: IN THE PAST 12 MONTHS HAS THE ELECTRIC, GAS, OIL, OR WATER COMPANY THREATENED TO SHUT OFF SERVICES IN YOUR HOME?: NO

## 2025-02-14 SDOH — SOCIAL STABILITY: SOCIAL NETWORK
IN A TYPICAL WEEK, HOW MANY TIMES DO YOU TALK ON THE PHONE WITH FAMILY, FRIENDS, OR NEIGHBORS?: MORE THAN THREE TIMES A WEEK

## 2025-02-14 SDOH — HEALTH STABILITY: PHYSICAL HEALTH: ON AVERAGE, HOW MANY MINUTES DO YOU ENGAGE IN EXERCISE AT THIS LEVEL?: 0 MIN

## 2025-02-14 SDOH — SOCIAL STABILITY: SOCIAL NETWORK
DO YOU BELONG TO ANY CLUBS OR ORGANIZATIONS SUCH AS CHURCH GROUPS, UNIONS, FRATERNAL OR ATHLETIC GROUPS, OR SCHOOL GROUPS?: NO

## 2025-02-14 SDOH — HEALTH STABILITY: PHYSICAL HEALTH
HOW OFTEN DO YOU NEED TO HAVE SOMEONE HELP YOU WHEN YOU READ INSTRUCTIONS, PAMPHLETS, OR OTHER WRITTEN MATERIAL FROM YOUR DOCTOR OR PHARMACY?: NEVER

## 2025-02-14 SDOH — SOCIAL STABILITY: SOCIAL NETWORK: HOW OFTEN DO YOU ATTEND MEETINGS OF THE CLUBS OR ORGANIZATIONS YOU BELONG TO?: NEVER

## 2025-02-14 SDOH — HEALTH STABILITY: MENTAL HEALTH
DO YOU FEEL STRESS - TENSE, RESTLESS, NERVOUS, OR ANXIOUS, OR UNABLE TO SLEEP AT NIGHT BECAUSE YOUR MIND IS TROUBLED ALL THE TIME - THESE DAYS?: NOT AT ALL

## 2025-02-14 SDOH — SOCIAL STABILITY: SOCIAL INSECURITY
WITHIN THE LAST YEAR, HAVE YOU BEEN KICKED, HIT, SLAPPED, OR OTHERWISE PHYSICALLY HURT BY YOUR PARTNER OR EX-PARTNER?: NO

## 2025-02-14 SDOH — HEALTH STABILITY: MENTAL HEALTH: HOW OFTEN DO YOU HAVE A DRINK CONTAINING ALCOHOL?: NEVER

## 2025-02-14 SDOH — SOCIAL STABILITY: SOCIAL INSECURITY: WITHIN THE LAST YEAR, HAVE YOU BEEN AFRAID OF YOUR PARTNER OR EX-PARTNER?: NO

## 2025-02-14 SDOH — HEALTH STABILITY: MENTAL HEALTH: HOW MANY DRINKS CONTAINING ALCOHOL DO YOU HAVE ON A TYPICAL DAY WHEN YOU ARE DRINKING?: PATIENT DOES NOT DRINK

## 2025-02-14 SDOH — ECONOMIC STABILITY: FOOD INSECURITY: WITHIN THE PAST 12 MONTHS, YOU WORRIED THAT YOUR FOOD WOULD RUN OUT BEFORE YOU GOT THE MONEY TO BUY MORE.: NEVER TRUE

## 2025-02-14 SDOH — SOCIAL STABILITY: SOCIAL INSECURITY: WITHIN THE LAST YEAR, HAVE YOU BEEN HUMILIATED OR EMOTIONALLY ABUSED IN OTHER WAYS BY YOUR PARTNER OR EX-PARTNER?: NO

## 2025-02-14 SDOH — ECONOMIC STABILITY: FOOD INSECURITY: WITHIN THE PAST 12 MONTHS, THE FOOD YOU BOUGHT JUST DIDN'T LAST AND YOU DIDN'T HAVE MONEY TO GET MORE.: NEVER TRUE

## 2025-02-14 SDOH — SOCIAL STABILITY: SOCIAL NETWORK: HOW OFTEN DO YOU GET TOGETHER WITH FRIENDS OR RELATIVES?: MORE THAN THREE TIMES A WEEK

## 2025-02-14 SDOH — SOCIAL STABILITY: SOCIAL INSECURITY
WITHIN THE LAST YEAR, HAVE YOU BEEN RAPED OR FORCED TO HAVE ANY KIND OF SEXUAL ACTIVITY BY YOUR PARTNER OR EX-PARTNER?: NO

## 2025-02-14 SDOH — SOCIAL STABILITY: SOCIAL INSECURITY: ARE YOU MARRIED, WIDOWED, DIVORCED, SEPARATED, NEVER MARRIED, OR LIVING WITH A PARTNER?: MARRIED

## 2025-02-14 SDOH — SOCIAL STABILITY: SOCIAL NETWORK: HOW OFTEN DO YOU ATTEND CHURCH OR RELIGIOUS SERVICES?: NEVER

## 2025-02-14 SDOH — HEALTH STABILITY: MENTAL HEALTH: HOW OFTEN DO YOU HAVE SIX OR MORE DRINKS ON ONE OCCASION?: NEVER

## 2025-02-14 SDOH — HEALTH STABILITY: PHYSICAL HEALTH: ON AVERAGE, HOW MANY DAYS PER WEEK DO YOU ENGAGE IN MODERATE TO STRENUOUS EXERCISE (LIKE A BRISK WALK)?: 0 DAYS

## 2025-02-14 ASSESSMENT — COGNITIVE AND FUNCTIONAL STATUS - GENERAL
DRESSING REGULAR UPPER BODY CLOTHING: A LITTLE
DRESSING REGULAR UPPER BODY CLOTHING: A LITTLE
HELP NEEDED FOR BATHING: A LITTLE
TOILETING: A LITTLE
STANDING UP FROM CHAIR USING ARMS: A LITTLE
STANDING UP FROM CHAIR USING ARMS: A LITTLE
HELP NEEDED FOR BATHING: A LITTLE
DAILY ACTIVITIY SCORE: 20
CLIMB 3 TO 5 STEPS WITH RAILING: A LITTLE
DAILY ACTIVITIY SCORE: 20
WALKING IN HOSPITAL ROOM: A LITTLE
MOVING TO AND FROM BED TO CHAIR: A LITTLE
TURNING FROM BACK TO SIDE WHILE IN FLAT BAD: A LITTLE
TURNING FROM BACK TO SIDE WHILE IN FLAT BAD: A LITTLE
MOVING TO AND FROM BED TO CHAIR: A LITTLE
TOILETING: A LITTLE
MOBILITY SCORE: 18
CLIMB 3 TO 5 STEPS WITH RAILING: A LITTLE
MOBILITY SCORE: 18
DRESSING REGULAR LOWER BODY CLOTHING: A LITTLE
DRESSING REGULAR LOWER BODY CLOTHING: A LITTLE
MOVING FROM LYING ON BACK TO SITTING ON SIDE OF FLAT BED WITH BEDRAILS: A LITTLE
MOVING FROM LYING ON BACK TO SITTING ON SIDE OF FLAT BED WITH BEDRAILS: A LITTLE
WALKING IN HOSPITAL ROOM: A LITTLE

## 2025-02-14 ASSESSMENT — PAIN SCALES - GENERAL
PAINLEVEL_OUTOF10: 4
PAINLEVEL_OUTOF10: 0 - NO PAIN
PAINLEVEL_OUTOF10: 2
PAINLEVEL_OUTOF10: 8
PAINLEVEL_OUTOF10: 9
PAINLEVEL_OUTOF10: 5 - MODERATE PAIN
PAINLEVEL_OUTOF10: 4
PAINLEVEL_OUTOF10: 0 - NO PAIN
PAINLEVEL_OUTOF10: 10 - WORST POSSIBLE PAIN
PAINLEVEL_OUTOF10: 2
PAINLEVEL_OUTOF10: 2
PAINLEVEL_OUTOF10: 3
PAINLEVEL_OUTOF10: 2
PAINLEVEL_OUTOF10: 0 - NO PAIN
PAINLEVEL_OUTOF10: 10 - WORST POSSIBLE PAIN
PAINLEVEL_OUTOF10: 4
PAINLEVEL_OUTOF10: 0 - NO PAIN

## 2025-02-14 ASSESSMENT — LIFESTYLE VARIABLES
AUDIT-C TOTAL SCORE: 0
SKIP TO QUESTIONS 9-10: 1

## 2025-02-14 ASSESSMENT — PAIN - FUNCTIONAL ASSESSMENT

## 2025-02-14 ASSESSMENT — ACTIVITIES OF DAILY LIVING (ADL)
ADL_ASSISTANCE: INDEPENDENT
BATHING_ASSISTANCE: STAND BY
ADL_ASSISTANCE: INDEPENDENT
ADLS_ADDRESSED: NO
LACK_OF_TRANSPORTATION: NO

## 2025-02-14 ASSESSMENT — PAIN DESCRIPTION - DESCRIPTORS: DESCRIPTORS: SORE

## 2025-02-14 NOTE — PROGRESS NOTES
02/14/25 1447   Discharge Planning   Expected Discharge Disposition Home H  ([place])   Does the patient need discharge transport arranged? No     Met with patient and introduced myself as Care Coordinator and member of the discharge planning team.  Patient is s/p  MVR . He plans to return home with his wife at time of discharge with [facility]. Care Coordinator will continue to follow for home going needs.

## 2025-02-14 NOTE — PROGRESS NOTES
"Pharmacy Medication History Review    Rigo Monet is a 64 y.o. male admitted for MR (mitral regurgitation). Pharmacy reviewed the patient's jqnyr-jy-jzhfqboxw medications and allergies for accuracy.    Medications ADDED:  N/A  Medications CHANGED:  N/A  Medications REMOVED/NOT TAKING:   Hibiclens  Peridex      The list below reflects the updated PTA list.   Prior to Admission Medications   Prescriptions Last Dose Informant   ascorbic acid (Vitamin C) 1,000 mg tablet Past Week Self   Sig: Take 1 tablet (1,000 mg) by mouth once daily.   chlorhexidine (Hibiclens) 4 % external liquid 2/13/2025 Morning Self   Sig: Use as directed daily preoperatively  Done with taking    chlorhexidine (Peridex) 0.12 % solution 2/13/2025 Morning Self   Sig: Swish and spit with 15ml of solution the night before and morning of surgery. Do not swallow.  Done with taking    lisinopril 20 mg tablet 2/8/2025 Self   Sig: Take 1 tablet (20 mg) by mouth once daily.      Facility-Administered Medications: None        The list below reflects the updated allergy list. Please review each documented allergy for additional clarification and justification.  Allergies  Reviewed by Breanna Phoenix on 2/14/2025   No Known Allergies         Patient declines M2B at discharge.     Sources:   [facility]  Pharmacy dispense history  Patient interview Good historian  Chart Review     Additional Comments:  Pt was able to tell me when he last took his meds  Pt reported that when he takes Lisinopril he gets really bad dry cough and would like the doctor to reevaluate the medication and possible prescribed something new.       [facility]  Pharmacy Technician  02/14/25     Secure Chat preferred   If no response call [phone] or [phone] \"Med Rec\"   "

## 2025-02-14 NOTE — CONSULTS
Nutrition Note:     Reason for Assessment: Admission nursing screening for non-healing stage 3 or 4 pressure ulcers and being on home tube feed or parenteral nutrition.    This service consulted for above reason.  Chart reviewed and events noted.  He is s/p mini right thoracotomy and MV repair on 2/13.  He is not on home TF or TPN and only wound is his surgical MSI.  He is on a Regular diet.  At this time, will defer MST and continue to be available as needed as course progresses via reconsult.       Dietary Orders (From admission, onward)       Start     Ordered    02/14/25 0729  Adult diet Regular  Diet effective now        Question:  Diet type  Answer:  Regular    02/14/25 0732    02/13/25 1756  May Participate in Room Service  ( ROOM SERVICE MAY PARTICIPATE)  Once        Question:  .  Answer:  Yes    02/13/25 3170                      Time Spent (min): 15 minutes

## 2025-02-14 NOTE — PROGRESS NOTES
02/14/25 0924   Discharge Planning   Living Arrangements Spouse/significant other   Support Systems Spouse/significant other;Children   Assistance Needed IPTA / no ADs / drives / employed / no home O2 / no HD   Type of Residence Private residence  (split level)   Number of Stairs to Enter Residence 1   Number of Stairs Within Residence 14   Do you have animals or pets at home? No   Who is requesting discharge planning? Provider   Home or Post Acute Services In home services   Type of Home Care Services Home nursing visits;Home OT;Home PT   Expected Discharge Disposition Home H  ([facility])   Does the patient need discharge transport arranged? No   Financial Resource Strain   How hard is it for you to pay for the very basics like food, housing, medical care, and heating? Not hard   Housing Stability   In the last 12 months, was there a time when you were not able to pay the mortgage or rent on time? N   In the past 12 months, how many times have you moved where you were living? 0   At any time in the past 12 months, were you homeless or living in a shelter (including now)? N   Transportation Needs   In the past 12 months, has lack of transportation kept you from medical appointments or from getting medications? no   In the past 12 months, has lack of transportation kept you from meetings, work, or from getting things needed for daily living? No   Patient Choice   Provider Choice list and CMS website (https://medicare.gov/care-compare#search) for post-acute Quality and Resource Measure Data were provided and reviewed with: Patient;Family  (2/14 - patient and wife)   Patient / Family choosing to utilize agency / facility established prior to hospitalization No   Stroke Family Assessment   Stroke Family Assessment Needed No   Intensity of Service   Intensity of Service 0-30 min     DC/SDOH assessments completed with patient and wife (Kelly). Verified EPIC info of address / PCP / and Pharmacy. Patient insurance  verified [employer]. Reviewed [facility] loc as a service, right to choice and list to choose.     Lindsey Centeno (LSW, MSW)

## 2025-02-14 NOTE — PROGRESS NOTES
Physical Therapy    Physical Therapy Evaluation    Patient Name: Rigo Monet  MRN: [id number]  Department: [place] [address]  Room: [address] [place] [address]  Today's Date: 2/14/2025   Time Calculation  1st session: 0911-0920  2nd session: 1034-1054  Time Calculation (min): 29 min    Assessment/Plan   PT Assessment  PT Assessment Results: Decreased strength, Decreased endurance, Impaired balance, Decreased mobility  Rehab Prognosis: Good  Barriers to Discharge Home: No anticipated barriers  Evaluation/Treatment Tolerance: Patient tolerated treatment well  Medical Staff Made Aware: Yes  End of Session Communication: Bedside nurse  Assessment Comment: Pt demonstrated ability to complete bed mobility, sit<>stand transfer and ambulation ~300ft with thoracic walker.  Vitals stability, no instability.  CGA-minAx1 provided with all mobility.  End of Session Patient Position:  (In WC)  IP OR SWING BED PT PLAN  Inpatient or Swing Bed: Inpatient  PT Plan  Treatment/Interventions: Bed mobility, Transfer training, Gait training, Stair training, Balance training, Strengthening, Endurance training, Therapeutic exercise, Therapeutic activity  PT Plan: Ongoing PT  PT Frequency: 3 times per week  PT Discharge Recommendations: Low intensity level of continued care  PT Recommended Transfer Status: Assistive device, Contact guard  PT - OK to Discharge: Yes    Subjective   General Visit Information:  General  Reason for Referral: Mini right thoracotomy and mitral valve repair  Referred By: Dr. Valdes  2/13/25.  Past Medical History Relevant to Rehab: Severe MR 2/2 flail posterior mitral valve leaflet, HTN, chronic nephrolithiasis  Family/Caregiver Present: No  Prior to Session Communication: Bedside nurse  Patient Position Received: Bed, 3 rail up, Alarm off, not on at start of session  Preferred Learning Style: auditory, verbal  General Comment: Pt awake, alert and willing to participate in PT session  Home Living:  Home Living  Type of Home: House  Lives  With: Spouse  Home Layout:  (1 ELIZ[address]ETH, bed/bath - 7 stairs down with railing, walk in shower)  Prior Level of Function:  Prior Function Per Pt/Caregiver Report  Level of [place]: Independent with ADLs and functional transfers, Independent with homemaking with ambulation  ADL Assistance: Independent  Homemaking Assistance: Independent  Ambulatory Assistance: Independent  Vocational: Full time employment  Prior Function Comments: (+) drives, (-) falls  Precautions:  Precautions  Hearing/Visual Limitations: (+) glasses, hearing WFL  Medical Precautions: Cardiac precautions, Fall precautions, Oxygen therapy device and L/min, Chest tube (CT x1, 4.5L)  Precautions Comment: MAP 70-90, SpO2 >92%      02/14/25 1034 02/14/25 1054   Vital Signs   Vitals Session Pre PT Post PT   Heart Rate 89 91   Resp 21 24   SpO2 93 % 94 %   /70 144/71   MAP (mmHg) 87 84   BP Method Automatic Automatic   Patient Position Lying Sitting   Vital Signs Comment  --  EOB: [birth year] ([birth year])     Objective   Pain:  Pain Assessment  Pain Assessment: 0-10  0-10 (Numeric) Pain Score: 5 - Moderate pain  Pain Type: Surgical pain  Pain Location:  (CT tube site)  Cognition:  Cognition  Overall Cognitive Status: Within Functional Limits  Orientation Level: Oriented X4    General Assessments:  General Observation  General Observation: Tele, dotson     Activity Tolerance  Endurance: Endurance does not limit participation in activity  Early Mobility/Exercise Safety Screen: Proceed with mobilization - No exclusion criteria met    Sensation  Light Touch: No apparent deficits (Post-op)    Strength  Strength Comments: BLEs at least 3/5 shown through functional mobility  Coordination  Movements are Fluid and Coordinated: Yes    Postural Control  Postural Control: Within Functional Limits    Static Sitting Balance  Static Sitting-Balance Support: Bilateral upper extremity supported, Feet supported  Static Sitting-Level of Assistance: Close supervision    Static  Standing Balance  Static Standing-Balance Support: Bilateral upper extremity supported  Static Standing-Level of Assistance: Contact guard  Static Standing-Comment/Number of Minutes: Thoracic walker  Functional Assessments:  ADL  ADL's Addressed: No    Bed Mobility  Bed Mobility: Yes  Bed Mobility 1  Bed Mobility 1: Supine to sitting  Level of Assistance 1: Minimum assistance (x1)  Bed Mobility Comments 1: HOB elevated, assistance with advancing trunk upright    Transfers  Transfer: Yes  Transfer 1  Transfer From 1: Sit to, Stand to  Transfer to 1: Sit, Stand  Technique 1: Sit to stand, Stand to sit  Transfer Device 1: Thoracic walker  Transfer Level of Assistance 1: Contact guard  Trials/Comments 1: Cues for hand placement and proper use of AD    Ambulation/Gait Training  Ambulation/Gait Training Performed: Yes  Ambulation/Gait Training 1  Surface 1: Level tile  Device 1: Thoracic walker  Assistance 1: Contact guard  Comments/Distance (ft) 1: ~300ft    Stairs  Stairs: No  Extremity/Trunk Assessments:  RUE   RUE : Within Functional Limits  LUE   LUE: Within Functional Limits  RLE   RLE : Within Functional Limits  LLE   LLE : Within Functional Limits  Outcome Measures:  [facility] Basic Mobility  Turning from your back to your side while in a flat bed without using bedrails: A little  Moving from lying on your back to sitting on the side of a flat bed without using bedrails: A little  Moving to and from bed to chair (including a wheelchair): A little  Standing up from a chair using your arms (e.g. wheelchair or bedside chair): A little  To walk in hospital room: A little  Climbing 3-5 steps with railing: A little  Basic Mobility - Total Score: 18    FSS-ICU  Ambulation: Walks >/ or equal to 150 feet with supervision  Rolling: Supervision or set-up only  Sitting: Supervision or set-up only  Transfer Sit-to-Stand: Supervision or set-up only  Transfer Supine-to-Sit: Supervision or set-up only  Total Score: 25    Early  Mobility/Exercise Safety Screen: Proceed with mobilization - No exclusion criteria met  ICU Mobility Scale: Walking with assistance of 1 person [8]    Encounter Problems       Encounter Problems (Active)       Balance       Pt will demonstrated ability to score at least 24/28 on the Tinetti balance assessment tool to ensure safety upon D/C.  (Progressing)       Start:  02/14/25    Expected End:  02/28/25               Mobility       Pt will demonstrated ability to ambulate >/=600ft with proper form and no balance deficits for safe home going.   (Progressing)       Start:  02/14/25    Expected End:  02/28/25            Pt will demonstrate ability to ascend/descend 7 stairs with unilateral rail and no balance deficits for safe home going.  (Progressing)       Start:  02/14/25    Expected End:  02/28/25               PT Transfers       Pt will demonstrate ability to complete 5X STS in < 12 sec with good from and consistency between transfers for safe D/C.  (Progressing)       Start:  02/14/25    Expected End:  02/28/25                   Education Documentation  Precautions, taught by Krystle Payan PT at 2/14/2025  2:55 PM.  Learner: Patient  Readiness: Acceptance  Method: Explanation, Demonstration  Response: Demonstrated Understanding, Verbalizes Understanding    Body Mechanics, taught by Krystle Payan PT at 2/14/2025  2:55 PM.  Learner: Patient  Readiness: Acceptance  Method: Explanation, Demonstration  Response: Demonstrated Understanding, Verbalizes Understanding    Mobility Training, taught by Krystle Payan PT at 2/14/2025  2:55 PM.  Learner: Patient  Readiness: Acceptance  Method: Explanation, Demonstration  Response: Demonstrated Understanding, Verbalizes Understanding    Education Comments  No comments found.

## 2025-02-14 NOTE — SIGNIFICANT EVENT
.Rapid Response Nurse Note: RADAR alert: 7    Pager time: 1331  Arrival time: 1348  Event end time: 135  Location:  300  [] Triage by phone or secure messaging    Rapid response initiated by:  [] Rapid response RN [] Family [] Nursing Supervisor [] Physician   [x] RADAR auto page [] Sepsis auto-page [] RN [] RT   [] NP/PA [] Other:     Primary reason for call:   [] BAT [] New CPAP/BiPAP [] Bleeding [] Change in mental status   [] Chest pain [] Code blue [] FiO2 >/= 50% [] HR </= 40 bpm   [] HR >/= 130 bpm [] Hyperglycemia [] Hypoglycemia [x] RADAR    [] RR </= 8 bpm [] RR >/= 30 bpm [] SBP </= 90 mmHg [] SpO2 < 90%   [] Seizure [] Sepsis [] Shortness of breath  [] Staff concern: see comments     Initial VS and/or RADAR VS: T 36.6 °C; HR 75; RR 24; /64; SPO2 95%.    Providers present at bedside (if applicable): Noelle (bedside RN)    Interventions:  [x] None [] ABG/VBG [] Assist w/ICU transfer [] BAT paged    [] Bag mask [] Blood [] Cardioversion [] Code Blue   [] Code blue for intubation [] Code status changed [] Chest x-ray [] EKG   [] IV fluid/bolus [] KUB x-ray [] Labs/cultures [] Medication   [] Nebulizer treatment [] NIPPV (CPAP/BiPAP) [] Oxygen [] Oral airway   [] Peripheral IV [] Palliative care consult [] CT/MRI [] Sepsis protocol    [] Suctioned [] Other:     Outcome:  [] Coded and  [] Code blue for intubation [] Coded and transferred to ICU []  on division   [x] Remained on division (no change) [] Remained on division + additional monitoring [] Remained in ED [] Transferred to ED   [] Transferred to ICU [] Transferred to inpatient status [] Transferred for interventions (procedure) [] Transferred to ICU stepdown    [] Transferred to surgery [] Transferred to telemetry [] Sepsis protocol [] STEMI protocol   [] Stroke protocol [x] Bedside nurse instructed to page rapid response for any concerns or acute change in condition/VS     Additional Comments: .Reviewed above RADAR VS with  bedside RN Noelle.  VS within patient's current trends.  Patient resting in bed at this time.  No interventions by rapid response team indicated at this time.  Staff to page rapid response for any concerns or acute change in condition/VS.

## 2025-02-14 NOTE — PROGRESS NOTES
"CTICU Progress Note  Rigo Monet/[id number]    Admit Date: 2/13/2025  Hospital Length of Stay: 1   ICU Length of Stay: 21h   CT SURGEON:     SUBJECTIVE:   CHE. Started regular diet, received hydral x1 for high SBP, off all vasoactive infusions.     Endorsing chest pain worsened with deep breaths over the past few hours, otherwise no acute complaints.     MEDICATIONS  Infusions:       Scheduled:  acetaminophen, 650 mg, q6h  aspirin, 81 mg, Daily  ceFAZolin, 2 g, q8h  heparin, 5,000 Units, q8h  hydrALAZINE, 10 mg, q6h  HYDROmorphone, 0.2 mg, Once  insulin lispro, 0-15 Units, Before meals & nightly  oxygen, , Continuous - Inhalation  polyethylene glycol, 17 g, BID  sennosides-docusate sodium, 2 tablet, BID      PRN:  alteplase, 2 mg, PRN  calcium gluconate, 1 g, q6h PRN  calcium gluconate, 2 g, q6h PRN  HYDROmorphone, 0.2 mg, q2h PRN  magnesium sulfate, 2 g, q6h PRN  magnesium sulfate, 4 g, q6h PRN  naloxone, 0.2 mg, q5 min PRN  ondansetron, 4 mg, q8h PRN   Or  ondansetron, 4 mg, q8h PRN  oxyCODONE, 10 mg, q4h PRN  oxyCODONE, 5 mg, q4h PRN  potassium chloride CR, 20 mEq, q6h PRN   Or  potassium chloride, 20 mEq, q6h PRN  potassium chloride CR, 40 mEq, q6h PRN   Or  potassium chloride, 40 mEq, q6h PRN  potassium chloride, 20 mEq, q6h PRN  potassium chloride, 40 mEq, q6h PRN        PHYSICAL EXAM:   Visit Vitals  BP (!) 175/95   Pulse 88   Temp 36.6 °C (97.9 °F) (Temporal)   Resp (!) 30   Ht 1.727 m (5' 8\")   Wt 96.5 kg (212 lb 11.9 oz)   SpO2 92%   BMI 32.35 kg/m²   Smoking Status Never   BSA 2.15 m²     Wt Readings from Last 5 Encounters:   02/13/25 96.5 kg (212 lb 11.9 oz)   02/03/25 96.1 kg (211 lb 14.4 oz)   12/20/24 96.4 kg (212 lb 9.6 oz)     INTAKE/OUTPUT:  I/O last 3 completed shifts:  In: 5764.9 (59.7 mL/kg) [I.V.:792.9 (8.2 mL/kg); Blood:484; IV Piggyback:1600]  Out: 5340 (55.3 mL/kg) [Urine:4825 (1.4 mL/kg/hr); Blood:250; Chest Tube:265]  Weight: 96.5 kg        LDA:  CVC 02/13/25 Double lumen Right Internal " jugular (Active)   Placement Date/Time: 02/13/25 (c) 0805   Hand Hygiene Performed Prior to CVC Insertion: Yes  Site Prep: Chlorhexidine   Site Prep Agent has Completely Dried Before Insertion: Yes  All 5 Sterile Barriers Used (Gloves, Gown, Cap, Mask, Large Sterile Cindi...   Number of days: 0       Arterial Line 02/13/25 Left Brachial (Active)   Placement Date/Time: 02/13/25 (c) 0728   Size: 20 G  Orientation: Left  Location: Brachial  Securement Method: Transparent dressing  Patient Tolerance: Tolerated well   Number of days: 0       Urethral Catheter Non-latex;Temperature probe 14 Fr. (Active)   Placement Date/Time: 02/13/25 0742   Placed by: MINH Jessica  Hand Hygiene Completed: Yes  Catheter Type: Non-latex;Temperature probe  Tube Size (Fr.): 14 Fr.  Catheter Balloon Size: 10 mL  Urine Returned: Yes   Number of days: 0       Chest Tube Pleural (Active)   Placement Date/Time: 02/13/25 1200   Chest Tube Location: Pleural   Number of days: 0         Vent settings:  Vent Mode: Pressure support  S RR:  [10-16] 10  S VT:  [550 mL] 550 mL  PEEP/CPAP (cm H2O):  [5 cm H20-8 cm H20] 5 cm H20  WV SUP:  [5 cm H20] 5 cm H20  MAP (cm H2O):  [12] 12    Physical Exam:   Physical Exam  Constitutional:       General: He is not in acute distress.     Appearance: Normal appearance.   HENT:      Head: Normocephalic and atraumatic.      Nose: Nose normal.      Mouth/Throat:      Mouth: Mucous membranes are moist.   Eyes:      Pupils: Pupils are equal, round, and reactive to light.   Neck:      Comments: RIJ MAC and [facility] c/d/i  Cardiovascular:      Rate and Rhythm: Normal rate and regular rhythm.      Pulses: Normal pulses.      Heart sounds: Normal heart sounds.   Pulmonary:      Effort: Pulmonary effort is normal.      Breath sounds: Normal breath sounds.      Comments: R pleural chest tube draining minimal serosang. No airleak in atrium.  Abdominal:      General: Abdomen is flat. Bowel sounds are normal. There is no distension.       Palpations: Abdomen is soft.      Tenderness: There is no abdominal tenderness.      Comments: Reducible umbilical hernia   Musculoskeletal:         General: No swelling. Normal range of motion.      Cervical back: Normal range of motion and neck supple.   Skin:     General: Skin is warm and dry.   Neurological:      General: No focal deficit present.      Mental Status: He is alert and oriented to person, place, and time.   Psychiatric:         Mood and Affect: Mood normal.        Images:     Invasive Hemodynamics:    Most Recent Range Past 24hrs   BP (Art) 139/64 Arterial Line BP 1  Min: 18/15  Max: 152/75   MAP(Art) 87 mmHg Arterial Line MAP 1 (mmHg)   Min: 16 mmHg  Max: 100 mmHg   RA/CVP   No data recorded   PA   No data recorded   PA(mean)   No data recorded   CO   No data recorded   CI   No data recorded   Mixed Venous   No data recorded   SVR    No data recorded     If Devices present, use phrases:    For LVAD, .lhcticulvad   For ECMO, .lhcticuecmo  For Impella, .lhcticuimpella    Daily Risk Screen:  Line unnecessary, will be removed today  Urinary catheter unnecessary, will be removed today    Assessment/Plan   Assessment:   64yoM w/ h/o severe MR 2/2 flail posterior mitral valve leaflet, HTN, chronic nephrolithiasis who underwent mini right thoracotomy and mitral valve repair with Dr. Valdes 2/13/25.      Plan:  NEURO:  No significant PMHx. Acute post operative pain, received R sided cryo-analgesia by surgical team. 10mg methadone intraop  -->  - Serial neuro and pain assessments   - Scheduled Tylenol 975mg q8hr   - PRN oxycodone  - PRN dilaudid for BT  - can consider acute pain consult if no improvement  - PT Consult  - CAM ICU score qshift  - Sleep/wake cycle hygiene     CV:  Patient has a history of HTN, severe MR Now s/p mini right thoracotomy, mitral valve repair with Dr. Valdes. Pre/Post EF: Normal BiV, severe MR pre/resolved post. Arrived to CTICU off all vasoactive support. No epicardial wires  placed. Briefly requiring nitro gtt but now transitioned to prns->  - Maintain goal MAP 70-90  - scheduled hydralazine  - Volume resuscitate as clinically indicated  - Start ASA today  - Hold home lisinopril     PULM:  No history of pulmonary disease.  Currently satting well on 2LNC. Chest tubes R pleural x1 to wall suction with slowing output -->  - CXR daily and prn while in ICU  - Wean FiO2 maintaining SpO2 >92%.   - IS q1h and OOB to chair  - Chest tubes to wall suction. Remove once criteria met     GI:  No significant history -->  - regular diet  - Colace/senna BID and miralax BID      : PMHx chronic nephrolithiasis. CSA-KEANU Risk Score 3.  No history of renal disease, baseline CRE 0.9-1. Creatinine stable post-op. Dotson in place and making adequate UOP. -->  - Can d/c dotson today  - Goal UOP 0.5ml/kg/hr  - RFP as clinically indicated  - Replete electrolytes per CTICU protocol      ENDO:  No significant PMHx -->  - Maintain BG <180, insulin per CTICU protocol     HEME:  Acute blood loss anemia and thrombocytopenia. -->    - Monitor drain output volume and characteristics  - CBC, coags, and fibrinogen post op and as clinically indicated  - SQH today   - SCDs for DVT prophylaxis.  - Last type and screen: 2/13     ID:  Afebrile, no current indications of infection. Borderline WBCs to 12.3. Neg MRSA. -->  - Trend temp q4h  - Periop cefazolin x 48hrs     Skin:  No active skin issues.  - preventative Mepilex dressings in place on sacrum and heels  - change preventative Mepilex weekly or more frequently as indicated (when moist/soiled)   - every shift skin assessment per nursing and weekly ICU skin rounds  - moisture barrier to be applied with indio care  - active skin problems addressed with nursing on daily rounds     Proph:  SCDs  SQH     G:  Lines  Right IJ MAC w [place] placed 2/13 -> remove today  Left brachial a-line placed 2/13 -> remove today  PIVs     F: Family: updated at bedside     A,B,C,D,E,F,G:  reviewed     Code status: Full Code  Emergency contact: Kelly Monet (Wife) [phone]    02/14/25 at 9:29 AM - Josesito Vázquez MD   Dispo: Transfer to LT3    CTICU TEAM PHONE [phone]

## 2025-02-14 NOTE — PROGRESS NOTES
Occupational Therapy    Evaluation/Treatment    Patient Name: Rigo Monet  MRN: [id number]  Department: [facility]  Room: [address] [place] [address]  Today's Date: 02/14/25  Time Calculation  Start Time: 1401  Stop Time: 1425  Time Calculation (min): 24 min     Assessment:  OT Assessment: Pt will benefit from continued skilled OT to increase independence in ADLs, functional mobility, activity tolerance, safety, and strength.  Prognosis: Excellent  Barriers to Discharge Home: No anticipated barriers  Evaluation/Treatment Tolerance: Patient tolerated treatment well  Medical Staff Made Aware: Yes  End of Session Communication: Bedside nurse  End of Session Patient Position: Bed, 3 rail up, Alarm off, not on at start of session  OT Assessment Results: Decreased ADL status, Decreased safe judgment during ADL, Decreased endurance, Decreased functional mobility, Decreased IADLs  Prognosis: Excellent  Barriers to Discharge: None  Evaluation/Treatment Tolerance: Patient tolerated treatment well  Medical Staff Made Aware: Yes  Strengths: Attitude of self, Support of Caregivers  Barriers to Participation: Comorbidities  Plan:  Treatment Interventions: ADL retraining, Functional transfer training, Endurance training, Equipment evaluation/education, Patient/family training, Compensatory technique education  OT Frequency: 2 times per week  OT Discharge Recommendations: Low intensity level of continued care  Equipment Recommended upon Discharge: Wheeled walker (shower bench)  OT Recommended Transfer Status: Assist of 1  OT - OK to Discharge: Yes (upon medical clearance)  Treatment Interventions: ADL retraining, Functional transfer training, Endurance training, Equipment evaluation/education, Patient/family training, Compensatory technique education    Subjective   Current Problem:  1. Mitral valve insufficiency, unspecified etiology  Anesthesia Intraoperative Transesophageal Echocardiogram    Anesthesia Intraoperative Transesophageal  Echocardiogram    Anesthesia Intraoperative Transesophageal Echocardiogram    Anesthesia Intraoperative Transesophageal Echocardiogram    Surgical Pathology Exam    Surgical Pathology Exam        General:   OT Received On: 02/14/25  General  Reason for Referral: mini right thoracotomy and mitral valve repair with Dr. 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, HTN, chronic nephrolithiasis  Family/Caregiver Present: Yes  Caregiver Feedback: family present and supportive in session  Prior to Session Communication: Bedside nurse  Patient Position Received: Bed, 3 rail up, Alarm off, not on at start of session  General Comment: Pt supine in bed upon arrival, agreeable to OT, RN present to take/return chest tube to suction in session   Precautions:  Hearing/Visual Limitations: (+) glasses, hearing WFL  Medical Precautions: Cardiac precautions, Fall precautions, Chest tube, Oxygen therapy device and L/min    Pain:  Pain Assessment  Pain Assessment: 0-10  0-10 (Numeric) Pain Score: 2  Pain Type: Surgical pain, Acute pain  Pain Location: Incision  Pain Orientation: Right  Pain Interventions: Repositioned, Distraction    Objective   Cognition:  Overall Cognitive Status: Within Functional Limits  Orientation Level: Oriented X4  Insight: Within function limits  Impulsive: Within functional limits  Processing Speed: Within funtional limits     Home Living:  Type of Home: House  Lives With: Spouse  Home Adaptive Equipment: None  Home Layout: Multi-level (7 steps in house, reports 7 stairs up to bedroom)  Home Access: Stairs to enter with rails  Entrance Stairs-Number of Steps: 1  Bathroom Shower/Tub: Walk-in shower  Bathroom Equipment: None  Home Living Comments: reports family can A PRN  Prior Function:  Level of [place]: Independent with ADLs and functional transfers, Independent with homemaking with ambulation  ADL Assistance: Independent  Homemaking  Assistance: Independent  Ambulatory Assistance: Independent  Vocational: Full time employment  IADL History:  Homemaking Responsibilities: Yes  Current License: Yes  Mode of Transportation: Car  Occupation: Full time employment  Type of Occupation:   ADL:  Eating Assistance: Independent (anticipated)  Grooming Assistance: Independent (anticipated)  Bathing Assistance: Stand by (anticipated seated)  UE Dressing Assistance: Stand by (gown management)  LE Dressing Assistance: Stand by (donned pants over B feet with wife A, sit > stand to stef over hips SBA)  Toileting Assistance with Device: Stand by (anticiapated)    Activity Tolerance:  Endurance: Tolerates 10 - 20 min exercise with multiple rests    Bed Mobility/Transfers: Bed Mobility  Bed Mobility: Yes  Bed Mobility 1  Bed Mobility 1: Supine to sitting, Sitting to supine  Level of Assistance 1: Contact guard  Bed Mobility Comments 1: HOB elevated    Transfers  Transfer: Yes  Transfer 1  Transfer From 1: Sit to, Stand to  Transfer to 1: Stand, Sit  Technique 1: Sit to stand, Stand to sit  Transfer Device 1: Walker  Transfer Level of Assistance 1: Contact guard  Trials/Comments 1: VCs for hand placement    Functional Mobility:  Functional Mobility  Functional Mobility Performed: Yes  Functional Mobility 1  Surface 1: Level tile  Device 1: Rolling walker  Assistance 1: Close supervision  Comments 1: mod household distance, pt reported nauseous with mobility  Sitting Balance:  Static Sitting Balance  Static Sitting-Balance Support: Feet supported  Static Sitting-Level of Assistance: Independent  Dynamic Sitting Balance  Dynamic Sitting-Balance Support: Feet supported  Dynamic Sitting-Level of Assistance: Independent  Standing Balance:  Static Standing Balance  Static Standing-Balance Support: Bilateral upper extremity supported  Static Standing-Level of Assistance: Close supervision  Dynamic Standing Balance  Dynamic Standing-Balance Support:  Bilateral upper extremity supported  Dynamic Standing-Level of Assistance: Close supervision    Modalities:  Modalities Used: No    Therapy/Activity:      Therapeutic Activity  Therapeutic Activity Performed: Yes  Therapeutic Activity 1: bed mobility, transfers, fxnl mob    Vision:Vision - Basic Assessment  Patient Visual Report:  (+ glasses)      Sensation:  Light Touch: No apparent deficits  Strength:  Strength Comments: BUE WFL    Perception:  Inattention/Neglect: Appears intact  Coordination:  Movements are Fluid and Coordinated: Yes   Hand Function:  Hand Function  Gross Grasp: Functional  Coordination: Functional  Extremities: RUE   RUE : Within Functional Limits and LUE   LUE: Within Functional Limits    Outcome Measures: [facility] Daily Activity  Putting on and taking off regular lower body clothing: A little  Bathing (including washing, rinsing, drying): A little  Putting on and taking off regular upper body clothing: A little  Toileting, which includes using toilet, bedpan or urinal: A little  Taking care of personal grooming such as brushing teeth: None  Eating Meals: None  Daily Activity - Total Score: 20     and OT Adult Other Outcome Measures  4AT: negative    Education Documentation  Body Mechanics, taught by Chava Stewart OT at 2/14/2025  3:19 PM.  Learner: Significant Other, Family, Patient  Readiness: Acceptance  Method: Explanation  Response: Verbalizes Understanding  Comment: ADLs, transfers, safety    Precautions, taught by Chava Stewart OT at 2/14/2025  3:19 PM.  Learner: Significant Other, Family, Patient  Readiness: Acceptance  Method: Explanation  Response: Verbalizes Understanding  Comment: ADLs, transfers, safety    ADL Training, taught by Chava Stewart OT at 2/14/2025  3:19 PM.  Learner: Significant Other, Family, Patient  Readiness: Acceptance  Method: Explanation  Response: Verbalizes Understanding  Comment: ADLs, transfers, safety    Education Comments  No comments  found.      Goals:  Encounter Problems       Encounter Problems (Active)       ADLs       Patient with complete lower body dressing with modified independent level of assistance donning and doffing all LE clothes  with PRN adaptive equipment while supported sitting and standing (Progressing)       Start:  02/14/25    Expected End:  03/07/25            Patient will complete toileting including hygiene clothing management/hygiene with modified independent level of assistance and LRAD. (Progressing)       Start:  02/14/25    Expected End:  03/07/25               BALANCE       Pt will maintain dynamic standing balance during ADL task with modified independent level of assistance in order to demonstrate decreased risk of falling and improved postural control. (Progressing)       Start:  02/14/25    Expected End:  03/07/25               MOBILITY       Patient will perform Functional mobility max Household distances/Community Distances with modified independent level of assistance and least restrictive device in order to improve safety and functional mobility. (Progressing)       Start:  02/14/25    Expected End:  03/07/25               TRANSFERS       Patient will perform bed mobility modified independent level of assistance and bed rails in order to improve safety and independence with mobility (Progressing)       Start:  02/14/25    Expected End:  03/07/25            Patient will complete sit to stand transfer with modified independent level of assistance and least restrictive device in order to improve safety and prepare for out of bed mobility. (Progressing)       Start:  02/14/25    Expected End:  03/07/25               VENECIA Valderrama/SHARI

## 2025-02-15 ENCOUNTER — APPOINTMENT (OUTPATIENT)
Dept: RADIOLOGY | Facility: HOSPITAL | Age: 65
DRG: 219 | End: 2025-02-15
Payer: COMMERCIAL

## 2025-02-15 ENCOUNTER — DOCUMENTATION (OUTPATIENT)
Dept: HOME HEALTH SERVICES | Facility: HOME HEALTH | Age: 65
End: 2025-02-15
Payer: COMMERCIAL

## 2025-02-15 LAB
ALBUMIN SERPL BCP-MCNC: 3.8 G/DL (ref 3.4–5)
ANION GAP SERPL CALC-SCNC: 13 MMOL/L (ref 10–20)
BUN SERPL-MCNC: 31 MG/DL (ref 6–23)
CALCIUM SERPL-MCNC: 9.9 MG/DL (ref 8.6–10.6)
CHLORIDE SERPL-SCNC: 103 MMOL/L (ref 98–107)
CO2 SERPL-SCNC: 26 MMOL/L (ref 21–32)
CREAT SERPL-MCNC: 1.35 MG/DL (ref 0.5–1.3)
EGFRCR SERPLBLD CKD-EPI 2021: 59 ML/MIN/1.73M*2
ERYTHROCYTE [DISTWIDTH] IN BLOOD BY AUTOMATED COUNT: 13.7 % (ref 11.5–14.5)
GLUCOSE BLD MANUAL STRIP-MCNC: 145 MG/DL (ref 74–99)
GLUCOSE SERPL-MCNC: 119 MG/DL (ref 74–99)
HCT VFR BLD AUTO: 38.9 % (ref 41–52)
HGB BLD-MCNC: 13.3 G/DL (ref 13.5–17.5)
MAGNESIUM SERPL-MCNC: 2.3 MG/DL (ref 1.6–2.4)
MCH RBC QN AUTO: 30.3 PG (ref 26–34)
MCHC RBC AUTO-ENTMCNC: 34.2 G/DL (ref 32–36)
MCV RBC AUTO: 89 FL (ref 80–100)
NRBC BLD-RTO: 0 /100 WBCS (ref 0–0)
PHOSPHATE SERPL-MCNC: 2.2 MG/DL (ref 2.5–4.9)
PLATELET # BLD AUTO: 149 X10*3/UL (ref 150–450)
POTASSIUM SERPL-SCNC: 4.5 MMOL/L (ref 3.5–5.3)
RBC # BLD AUTO: 4.39 X10*6/UL (ref 4.5–5.9)
SODIUM SERPL-SCNC: 137 MMOL/L (ref 136–145)
STAPHYLOCOCCUS SPEC CULT: NORMAL
WBC # BLD AUTO: 15.7 X10*3/UL (ref 4.4–11.3)

## 2025-02-15 PROCEDURE — 94640 AIRWAY INHALATION TREATMENT: CPT

## 2025-02-15 PROCEDURE — 83735 ASSAY OF MAGNESIUM: CPT | Performed by: NURSE PRACTITIONER

## 2025-02-15 PROCEDURE — 82947 ASSAY GLUCOSE BLOOD QUANT: CPT

## 2025-02-15 PROCEDURE — 71045 X-RAY EXAM CHEST 1 VIEW: CPT

## 2025-02-15 PROCEDURE — 71045 X-RAY EXAM CHEST 1 VIEW: CPT | Performed by: RADIOLOGY

## 2025-02-15 PROCEDURE — 2500000001 HC RX 250 WO HCPCS SELF ADMINISTERED DRUGS (ALT 637 FOR MEDICARE OP)

## 2025-02-15 PROCEDURE — 2500000004 HC RX 250 GENERAL PHARMACY W/ HCPCS (ALT 636 FOR OP/ED): Performed by: PHYSICIAN ASSISTANT

## 2025-02-15 PROCEDURE — 36415 COLL VENOUS BLD VENIPUNCTURE: CPT | Performed by: NURSE PRACTITIONER

## 2025-02-15 PROCEDURE — 2500000004 HC RX 250 GENERAL PHARMACY W/ HCPCS (ALT 636 FOR OP/ED)

## 2025-02-15 PROCEDURE — 2500000001 HC RX 250 WO HCPCS SELF ADMINISTERED DRUGS (ALT 637 FOR MEDICARE OP): Performed by: PHYSICIAN ASSISTANT

## 2025-02-15 PROCEDURE — 94669 MECHANICAL CHEST WALL OSCILL: CPT

## 2025-02-15 PROCEDURE — 2500000004 HC RX 250 GENERAL PHARMACY W/ HCPCS (ALT 636 FOR OP/ED): Performed by: NURSE PRACTITIONER

## 2025-02-15 PROCEDURE — 2500000001 HC RX 250 WO HCPCS SELF ADMINISTERED DRUGS (ALT 637 FOR MEDICARE OP): Performed by: NURSE PRACTITIONER

## 2025-02-15 PROCEDURE — 99231 SBSQ HOSP IP/OBS SF/LOW 25: CPT | Performed by: PHYSICIAN ASSISTANT

## 2025-02-15 PROCEDURE — 85027 COMPLETE CBC AUTOMATED: CPT | Performed by: NURSE PRACTITIONER

## 2025-02-15 PROCEDURE — 1200000002 HC GENERAL ROOM WITH TELEMETRY DAILY

## 2025-02-15 PROCEDURE — 94668 MNPJ CHEST WALL SBSQ: CPT

## 2025-02-15 PROCEDURE — 80069 RENAL FUNCTION PANEL: CPT | Performed by: NURSE PRACTITIONER

## 2025-02-15 RX ORDER — FUROSEMIDE 10 MG/ML
40 INJECTION INTRAMUSCULAR; INTRAVENOUS ONCE
Status: COMPLETED | OUTPATIENT
Start: 2025-02-15 | End: 2025-02-15

## 2025-02-15 RX ORDER — FUROSEMIDE 10 MG/ML
40 INJECTION INTRAMUSCULAR; INTRAVENOUS ONCE
Status: DISCONTINUED | OUTPATIENT
Start: 2025-02-15 | End: 2025-02-15

## 2025-02-15 RX ORDER — METOPROLOL TARTRATE 25 MG/1
25 TABLET, FILM COATED ORAL 2 TIMES DAILY
Status: DISCONTINUED | OUTPATIENT
Start: 2025-02-15 | End: 2025-02-18 | Stop reason: HOSPADM

## 2025-02-15 RX ADMIN — ONDANSETRON 4 MG: 2 INJECTION INTRAMUSCULAR; INTRAVENOUS at 20:44

## 2025-02-15 RX ADMIN — Medication 1 TABLET: at 08:19

## 2025-02-15 RX ADMIN — ASPIRIN 81 MG: 81 TABLET, COATED ORAL at 08:19

## 2025-02-15 RX ADMIN — FUROSEMIDE 40 MG: 10 INJECTION, SOLUTION INTRAVENOUS at 11:12

## 2025-02-15 RX ADMIN — METOPROLOL TARTRATE 25 MG: 25 TABLET, FILM COATED ORAL at 20:44

## 2025-02-15 RX ADMIN — HEPARIN SODIUM 5000 UNITS: 5000 INJECTION, SOLUTION INTRAVENOUS; SUBCUTANEOUS at 16:59

## 2025-02-15 RX ADMIN — FUROSEMIDE 40 MG: 10 INJECTION, SOLUTION INTRAVENOUS at 16:59

## 2025-02-15 RX ADMIN — POLYSACCHARIDE-IRON COMPLEX 150 MG: 150 CAPSULE ORAL at 08:19

## 2025-02-15 RX ADMIN — ACETAMINOPHEN 650 MG: 325 TABLET ORAL at 20:44

## 2025-02-15 RX ADMIN — ONDANSETRON 4 MG: 2 INJECTION INTRAMUSCULAR; INTRAVENOUS at 08:18

## 2025-02-15 RX ADMIN — CEFAZOLIN SODIUM 2 G: 2 INJECTION, SOLUTION INTRAVENOUS at 00:20

## 2025-02-15 RX ADMIN — SENNOSIDES AND DOCUSATE SODIUM 2 TABLET: 50; 8.6 TABLET ORAL at 08:19

## 2025-02-15 RX ADMIN — METOPROLOL TARTRATE 12.5 MG: 25 TABLET, FILM COATED ORAL at 08:19

## 2025-02-15 RX ADMIN — HEPARIN SODIUM 5000 UNITS: 5000 INJECTION, SOLUTION INTRAVENOUS; SUBCUTANEOUS at 08:18

## 2025-02-15 RX ADMIN — POLYETHYLENE GLYCOL 3350 17 G: 17 POWDER, FOR SOLUTION ORAL at 08:18

## 2025-02-15 RX ADMIN — CEFAZOLIN SODIUM 2 G: 2 INJECTION, SOLUTION INTRAVENOUS at 08:18

## 2025-02-15 RX ADMIN — ACETAMINOPHEN 650 MG: 325 TABLET ORAL at 06:43

## 2025-02-15 RX ADMIN — POLYETHYLENE GLYCOL 3350 17 G: 17 POWDER, FOR SOLUTION ORAL at 20:43

## 2025-02-15 RX ADMIN — ACETAMINOPHEN 650 MG: 325 TABLET ORAL at 13:00

## 2025-02-15 RX ADMIN — OXYCODONE 5 MG: 5 TABLET ORAL at 08:18

## 2025-02-15 RX ADMIN — HEPARIN SODIUM 5000 UNITS: 5000 INJECTION, SOLUTION INTRAVENOUS; SUBCUTANEOUS at 00:21

## 2025-02-15 RX ADMIN — OXYCODONE 5 MG: 5 TABLET ORAL at 20:44

## 2025-02-15 RX ADMIN — SENNOSIDES AND DOCUSATE SODIUM 2 TABLET: 50; 8.6 TABLET ORAL at 20:44

## 2025-02-15 ASSESSMENT — COGNITIVE AND FUNCTIONAL STATUS - GENERAL
TOILETING: A LITTLE
TURNING FROM BACK TO SIDE WHILE IN FLAT BAD: A LITTLE
DRESSING REGULAR UPPER BODY CLOTHING: A LITTLE
MOVING TO AND FROM BED TO CHAIR: A LITTLE
CLIMB 3 TO 5 STEPS WITH RAILING: A LOT
DAILY ACTIVITIY SCORE: 20
MOVING FROM LYING ON BACK TO SITTING ON SIDE OF FLAT BED WITH BEDRAILS: A LITTLE
WALKING IN HOSPITAL ROOM: A LITTLE
MOVING TO AND FROM BED TO CHAIR: A LITTLE
MOBILITY SCORE: 18
TURNING FROM BACK TO SIDE WHILE IN FLAT BAD: A LITTLE
DRESSING REGULAR UPPER BODY CLOTHING: A LITTLE
STANDING UP FROM CHAIR USING ARMS: A LITTLE
DRESSING REGULAR LOWER BODY CLOTHING: A LITTLE
HELP NEEDED FOR BATHING: A LITTLE
CLIMB 3 TO 5 STEPS WITH RAILING: A LITTLE
DRESSING REGULAR LOWER BODY CLOTHING: A LITTLE
HELP NEEDED FOR BATHING: A LITTLE
MOBILITY SCORE: 17
PERSONAL GROOMING: A LITTLE
WALKING IN HOSPITAL ROOM: A LITTLE
STANDING UP FROM CHAIR USING ARMS: A LITTLE
TOILETING: A LITTLE
DAILY ACTIVITIY SCORE: 19
MOVING FROM LYING ON BACK TO SITTING ON SIDE OF FLAT BED WITH BEDRAILS: A LITTLE

## 2025-02-15 ASSESSMENT — PAIN - FUNCTIONAL ASSESSMENT: PAIN_FUNCTIONAL_ASSESSMENT: 0-10

## 2025-02-15 ASSESSMENT — PAIN DESCRIPTION - ORIENTATION
ORIENTATION: RIGHT
ORIENTATION: RIGHT

## 2025-02-15 ASSESSMENT — PAIN SCALES - GENERAL
PAINLEVEL_OUTOF10: 5 - MODERATE PAIN
PAINLEVEL_OUTOF10: 5 - MODERATE PAIN
PAINLEVEL_OUTOF10: 6

## 2025-02-15 ASSESSMENT — PAIN DESCRIPTION - LOCATION
LOCATION: RIB CAGE
LOCATION: CHEST
LOCATION: INCISION

## 2025-02-15 NOTE — HH CARE COORDINATION
Home Care received a referral for Nursing. Unfortunately, we are unable to accept and process the referral at this time.    Reason:  Patient's Home is Outside of [facility] Service Area    Patients, please reach out to the referring provider or your PCP to assist in obtaining an alternative home care agency and/or guidance to meet your needs.    Providers, please reach out to [facility] at [phone] with any questions regarding the declined referral.

## 2025-02-15 NOTE — PROGRESS NOTES
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 Valdes 2/13/25.       OPERATION/PROCEDURE: 2/13/25 with Denilson Valdes MD  Mini Right Thoracotomy, Mitral Valve Repair 36 Simuplus Band, Right Femoral Arterial and Venous Cannulation, Intercostal Cryo Analgesia     CTICU Course: uneventful  Transferred to T3 on 2/14/25      Interval History:   No events overnight    SUBJECTIVE:  Sitting in chair having pain from right thoracotomy site no sepcific concerns otherwise    Objective   /74 (BP Location: Right arm, Patient Position: Sitting)   Pulse 76   Temp 36.5 °C (97.7 °F) (Temporal)   Resp 17   Ht 1.727 m (5' 8\")   Wt 94.8 kg (209 lb)   SpO2 92%   BMI 31.78 kg/m²   0-10 (Numeric) Pain Score: 5 - Moderate pain   3 Day Weight Change: Unable to Calculate    Intake and Output    Intake/Output Summary (Last 24 hours) at 2/15/2025 1512  Last data filed at 2/15/2025 1300  Gross per 24 hour   Intake 720 ml   Output 1505 ml   Net -785 ml       Physical Exam  Physical Exam  Constitutional:       General: He is not in acute distress.     Appearance: He is not toxic-appearing.   Eyes:      Pupils: Pupils are equal, round, and reactive to light.   Cardiovascular:      Rate and Rhythm: Normal rate and regular rhythm.      Pulses: Normal pulses.      Comments: Right thoractomy incision well approximated without erythema or drainage      Pulmonary:      Effort: Pulmonary effort is normal. No respiratory distress.      Comments: Right chest tube in place with minimal sersosang drainage, no air leaks with valsalva  Abdominal:      General: Abdomen is flat. There is no distension.      Palpations: Abdomen is soft.      Tenderness: There is no abdominal tenderness.   Musculoskeletal:         General: Normal range of motion.      Right lower leg: No edema.    "   Left lower leg: No edema.   Skin:     General: Skin is warm and dry.   Neurological:      General: No focal deficit present.      Mental Status: He is alert and oriented to person, place, and time.   Psychiatric:         Mood and Affect: Mood normal.         Behavior: Behavior normal.           Medications  Scheduled medications  acetaminophen, 650 mg, oral, q6h  aspirin, 81 mg, oral, Daily  heparin, 5,000 Units, subcutaneous, q8h  iron polysaccharides, 150 mg, oral, Daily  metoprolol tartrate, 12.5 mg, oral, BID  multivitamin with minerals, 1 tablet, oral, Daily  oxygen, , inhalation, Continuous - 02/gases  perflutren lipid microspheres, 0.5-10 mL of dilution, intravenous, Once in imaging  perflutren protein A microsphere, 0.5 mL, intravenous, Once in imaging  polyethylene glycol, 17 g, oral, BID  sennosides-docusate sodium, 2 tablet, oral, BID  sulfur hexafluoride microsphr, 2 mL, intravenous, Once in imaging    Continuous medications   PRN medications  PRN medications: bisacodyl, HYDROmorphone, naloxone, ondansetron **OR** ondansetron, oxyCODONE, oxyCODONE    Labs  Results for orders placed or performed during the hospital encounter of 02/13/25 (from the past 24 hours)   POCT GLUCOSE   Result Value Ref Range    POCT Glucose 172 (H) 74 - 99 mg/dL   POCT GLUCOSE   Result Value Ref Range    POCT Glucose 163 (H) 74 - 99 mg/dL   Magnesium   Result Value Ref Range    Magnesium 2.30 1.60 - 2.40 mg/dL   CBC   Result Value Ref Range    WBC 15.7 (H) 4.4 - 11.3 x10*3/uL    nRBC 0.0 0.0 - 0.0 /100 WBCs    RBC 4.39 (L) 4.50 - 5.90 x10*6/uL    Hemoglobin 13.3 (L) 13.5 - 17.5 g/dL    Hematocrit 38.9 (L) 41.0 - 52.0 %    MCV 89 80 - 100 fL    MCH 30.3 26.0 - 34.0 pg    MCHC 34.2 32.0 - 36.0 g/dL    RDW 13.7 11.5 - 14.5 %    Platelets 149 (L) 150 - 450 x10*3/uL   Renal Function Panel   Result Value Ref Range    Glucose 119 (H) 74 - 99 mg/dL    Sodium 137 136 - 145 mmol/L    Potassium 4.5 3.5 - 5.3 mmol/L    Chloride 103 98 -  107 mmol/L    Bicarbonate 26 21 - 32 mmol/L    Anion Gap 13 10 - 20 mmol/L    Urea Nitrogen 31 (H) 6 - 23 mg/dL    Creatinine 1.35 (H) 0.50 - 1.30 mg/dL    eGFR 59 (L) >60 mL/min/1.73m*2    Calcium 9.9 8.6 - 10.6 mg/dL    Phosphorus 2.2 (L) 2.5 - 4.9 mg/dL    Albumin 3.8 3.4 - 5.0 g/dL   POCT GLUCOSE   Result Value Ref Range    POCT Glucose 145 (H) 74 - 99 mg/dL             IMPRESSION & PLAN:  POD # 2 s/p Mini Right Thoracotomy, Mitral Valve Repair 36 Simuplus Band   - Increase activity/ ambulation; PT/OT  - Encourage IS, C/DB; respiratory therapy; wean O2 as guilherme   - Cardiac rehab referral   - Continue cardiac meds: ASA, BB  -  1 right pleural in place to -20cm suction; morning CXR with right sided pneumothorax with chest tube in place with no air leaks on valsalva, will keep in for now and repeat CXR  - 2v CXR once chest tube removed  - Postop echo ordered  - NO epicardial wires  - Tele until discharge  - Optimize nutrition and electrolytes    Acute post op pain  - Scheduled tylenol and PRN oxycodone  - PRN dilaudid while chest tube in place    NSR  - Increase metoprolol to 25mg BID today  - Adjust medications as tolerated    Acute Blood Loss Anemia - improving  Recent Labs     02/15/25  0524 02/14/25  0323 02/13/25  1223 02/03/25  1538   HGB 13.3* 12.5* 12.4* 14.8   HCT 38.9* 37.1* 36.4* 46.7   - MV  - DC iron today  - Daily labs, transfuse as indicated    Thrombocytopenia improving  Recent Labs     02/15/25  0524 02/14/25  0323 02/13/25  1223 02/03/25  1538   * 136* 121* 174   - Etiology likely postop/CPB related  - Continue to trend with daily CBCs    Volume/Electrolyte Status: Preop wt Weight: 96.5 kg (212 lb 11.9 oz)   KEANU--> likely in setting of volume overload  Vitals:    02/15/25 0600   Weight: 94.8 kg (209 lb)     - Weight:   - Adjust diuresis as needed for postop cardiac surgery hypervolemia  - Replete electrolytes for hypokalemia/hypomagnesemia/hypophosphatemia as needed =  - Daily weights and  strict I&Os  - Daily RFP while admitted  - Lasix 40mg x2 today      VTE Prophylaxis: SCDs/TEDs, ambulation, SQ heparin  Code Status: Full Code    Dispo  - PT/OT recs low  - Would benefit from homecare for cardiac surgery carepath and RN visits  - Anticipate discharge Monday/Tuesday timeframe  - Will continue to assess discharge needs      Luis Flowers PA-C  Cardiac Surgery [facility]  [facility]  Team Phone [phone]    2/15/2025  3:12 PM

## 2025-02-16 ENCOUNTER — APPOINTMENT (OUTPATIENT)
Dept: RADIOLOGY | Facility: HOSPITAL | Age: 65
DRG: 219 | End: 2025-02-16
Payer: COMMERCIAL

## 2025-02-16 VITALS
RESPIRATION RATE: 18 BRPM | OXYGEN SATURATION: 96 % | WEIGHT: 204.15 LBS | SYSTOLIC BLOOD PRESSURE: 123 MMHG | TEMPERATURE: 97.3 F | BODY MASS INDEX: 30.94 KG/M2 | HEART RATE: 75 BPM | DIASTOLIC BLOOD PRESSURE: 77 MMHG | HEIGHT: 68 IN

## 2025-02-16 LAB
ALBUMIN SERPL BCP-MCNC: 3.4 G/DL (ref 3.4–5)
ANION GAP SERPL CALC-SCNC: 13 MMOL/L (ref 10–20)
BLOOD EXPIRATION DATE: NORMAL
BUN SERPL-MCNC: 36 MG/DL (ref 6–23)
CALCIUM SERPL-MCNC: 9.7 MG/DL (ref 8.6–10.6)
CHLORIDE SERPL-SCNC: 100 MMOL/L (ref 98–107)
CO2 SERPL-SCNC: 30 MMOL/L (ref 21–32)
CREAT SERPL-MCNC: 1.14 MG/DL (ref 0.5–1.3)
DISPENSE STATUS: NORMAL
EGFRCR SERPLBLD CKD-EPI 2021: 72 ML/MIN/1.73M*2
ERYTHROCYTE [DISTWIDTH] IN BLOOD BY AUTOMATED COUNT: 13.5 % (ref 11.5–14.5)
GLUCOSE SERPL-MCNC: 91 MG/DL (ref 74–99)
HCT VFR BLD AUTO: 39.2 % (ref 41–52)
HGB BLD-MCNC: 12.7 G/DL (ref 13.5–17.5)
MAGNESIUM SERPL-MCNC: 2.25 MG/DL (ref 1.6–2.4)
MCH RBC QN AUTO: 30.2 PG (ref 26–34)
MCHC RBC AUTO-ENTMCNC: 32.4 G/DL (ref 32–36)
MCV RBC AUTO: 93 FL (ref 80–100)
NRBC BLD-RTO: 0 /100 WBCS (ref 0–0)
PHOSPHATE SERPL-MCNC: 1.6 MG/DL (ref 2.5–4.9)
PLATELET # BLD AUTO: 150 X10*3/UL (ref 150–450)
POTASSIUM SERPL-SCNC: 4 MMOL/L (ref 3.5–5.3)
PRODUCT BLOOD TYPE: 5100
PRODUCT CODE: NORMAL
RBC # BLD AUTO: 4.21 X10*6/UL (ref 4.5–5.9)
SODIUM SERPL-SCNC: 139 MMOL/L (ref 136–145)
UNIT ABO: NORMAL
UNIT NUMBER: NORMAL
UNIT RH: NORMAL
UNIT VOLUME: 283
UNIT VOLUME: 285
UNIT VOLUME: 288
UNIT VOLUME: 350
WBC # BLD AUTO: 9.6 X10*3/UL (ref 4.4–11.3)
XM INTEP: NORMAL

## 2025-02-16 PROCEDURE — 1200000002 HC GENERAL ROOM WITH TELEMETRY DAILY

## 2025-02-16 PROCEDURE — 2500000001 HC RX 250 WO HCPCS SELF ADMINISTERED DRUGS (ALT 637 FOR MEDICARE OP): Performed by: PHYSICIAN ASSISTANT

## 2025-02-16 PROCEDURE — 36415 COLL VENOUS BLD VENIPUNCTURE: CPT | Performed by: NURSE PRACTITIONER

## 2025-02-16 PROCEDURE — 83735 ASSAY OF MAGNESIUM: CPT | Performed by: NURSE PRACTITIONER

## 2025-02-16 PROCEDURE — 94640 AIRWAY INHALATION TREATMENT: CPT

## 2025-02-16 PROCEDURE — 2500000001 HC RX 250 WO HCPCS SELF ADMINISTERED DRUGS (ALT 637 FOR MEDICARE OP): Performed by: NURSE PRACTITIONER

## 2025-02-16 PROCEDURE — 99232 SBSQ HOSP IP/OBS MODERATE 35: CPT

## 2025-02-16 PROCEDURE — 94669 MECHANICAL CHEST WALL OSCILL: CPT

## 2025-02-16 PROCEDURE — 94668 MNPJ CHEST WALL SBSQ: CPT

## 2025-02-16 PROCEDURE — 2500000005 HC RX 250 GENERAL PHARMACY W/O HCPCS: Performed by: NURSE PRACTITIONER

## 2025-02-16 PROCEDURE — 2500000004 HC RX 250 GENERAL PHARMACY W/ HCPCS (ALT 636 FOR OP/ED)

## 2025-02-16 PROCEDURE — 71045 X-RAY EXAM CHEST 1 VIEW: CPT

## 2025-02-16 PROCEDURE — 71045 X-RAY EXAM CHEST 1 VIEW: CPT | Performed by: RADIOLOGY

## 2025-02-16 PROCEDURE — 2500000004 HC RX 250 GENERAL PHARMACY W/ HCPCS (ALT 636 FOR OP/ED): Performed by: NURSE PRACTITIONER

## 2025-02-16 PROCEDURE — 2500000001 HC RX 250 WO HCPCS SELF ADMINISTERED DRUGS (ALT 637 FOR MEDICARE OP)

## 2025-02-16 PROCEDURE — 85027 COMPLETE CBC AUTOMATED: CPT | Performed by: NURSE PRACTITIONER

## 2025-02-16 PROCEDURE — 80069 RENAL FUNCTION PANEL: CPT | Performed by: NURSE PRACTITIONER

## 2025-02-16 RX ORDER — FUROSEMIDE 10 MG/ML
20 INJECTION INTRAMUSCULAR; INTRAVENOUS ONCE
Status: COMPLETED | OUTPATIENT
Start: 2025-02-16 | End: 2025-02-16

## 2025-02-16 RX ADMIN — ACETAMINOPHEN 650 MG: 325 TABLET ORAL at 17:10

## 2025-02-16 RX ADMIN — METOPROLOL TARTRATE 25 MG: 25 TABLET, FILM COATED ORAL at 21:38

## 2025-02-16 RX ADMIN — SENNOSIDES AND DOCUSATE SODIUM 2 TABLET: 50; 8.6 TABLET ORAL at 08:40

## 2025-02-16 RX ADMIN — HEPARIN SODIUM 5000 UNITS: 5000 INJECTION, SOLUTION INTRAVENOUS; SUBCUTANEOUS at 17:11

## 2025-02-16 RX ADMIN — METOPROLOL TARTRATE 25 MG: 25 TABLET, FILM COATED ORAL at 08:40

## 2025-02-16 RX ADMIN — FUROSEMIDE 20 MG: 10 INJECTION, SOLUTION INTRAVENOUS at 12:25

## 2025-02-16 RX ADMIN — Medication 2 L/MIN: at 08:40

## 2025-02-16 RX ADMIN — HEPARIN SODIUM 5000 UNITS: 5000 INJECTION, SOLUTION INTRAVENOUS; SUBCUTANEOUS at 01:26

## 2025-02-16 RX ADMIN — ACETAMINOPHEN 650 MG: 325 TABLET ORAL at 06:33

## 2025-02-16 RX ADMIN — ACETAMINOPHEN 650 MG: 325 TABLET ORAL at 01:25

## 2025-02-16 RX ADMIN — HEPARIN SODIUM 5000 UNITS: 5000 INJECTION, SOLUTION INTRAVENOUS; SUBCUTANEOUS at 08:40

## 2025-02-16 RX ADMIN — ACETAMINOPHEN 650 MG: 325 TABLET ORAL at 12:24

## 2025-02-16 RX ADMIN — POLYETHYLENE GLYCOL 3350 17 G: 17 POWDER, FOR SOLUTION ORAL at 08:39

## 2025-02-16 RX ADMIN — Medication 1 TABLET: at 08:40

## 2025-02-16 RX ADMIN — ASPIRIN 81 MG: 81 TABLET, COATED ORAL at 08:40

## 2025-02-16 ASSESSMENT — COGNITIVE AND FUNCTIONAL STATUS - GENERAL
CLIMB 3 TO 5 STEPS WITH RAILING: A LITTLE
MOBILITY SCORE: 18
MOVING FROM LYING ON BACK TO SITTING ON SIDE OF FLAT BED WITH BEDRAILS: A LITTLE
STANDING UP FROM CHAIR USING ARMS: A LITTLE
DRESSING REGULAR UPPER BODY CLOTHING: A LITTLE
WALKING IN HOSPITAL ROOM: A LITTLE
TOILETING: A LITTLE
MOVING TO AND FROM BED TO CHAIR: A LITTLE
DAILY ACTIVITIY SCORE: 20
DRESSING REGULAR LOWER BODY CLOTHING: A LITTLE
TURNING FROM BACK TO SIDE WHILE IN FLAT BAD: A LITTLE
HELP NEEDED FOR BATHING: A LITTLE

## 2025-02-16 ASSESSMENT — PAIN - FUNCTIONAL ASSESSMENT
PAIN_FUNCTIONAL_ASSESSMENT: 0-10

## 2025-02-16 ASSESSMENT — PAIN SCALES - GENERAL
PAINLEVEL_OUTOF10: 2
PAINLEVEL_OUTOF10: 4
PAINLEVEL_OUTOF10: 2
PAINLEVEL_OUTOF10: 3

## 2025-02-16 ASSESSMENT — PAIN DESCRIPTION - LOCATION: LOCATION: RIB CAGE

## 2025-02-16 ASSESSMENT — PAIN DESCRIPTION - ORIENTATION: ORIENTATION: RIGHT

## 2025-02-16 NOTE — CARE PLAN
The patient's goals for the shift include      The clinical goals for the shift include pt will report adequate pain control this shift    Patient will remain safe throughout the shift.

## 2025-02-16 NOTE — PROGRESS NOTES
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 Valdes 2/13/25.       OPERATION/PROCEDURE: 2/13/25 with Denilson Valdes MD  Mini Right Thoracotomy, Mitral Valve Repair 36 Simuplus Band, Right Femoral Arterial and Venous Cannulation, Intercostal Cryo Analgesia     CTICU Course: uneventful    Transferred to T3 on 2/14/25    Interval History:   No acute events reported    SUBJECTIVE:  Patient seen and examined today. Tele shows SR 70's. On 2L NC. Right pleural CT remains in place. Patient reports mild pain otherwise denies any complaints    Objective   /69 (BP Location: Right arm, Patient Position: Sitting)   Pulse 77   Temp 36.9 °C (98.4 °F) (Temporal)   Resp 18   Ht 1.727 m (5' 8\")   Wt 92.6 kg (204 lb 2.3 oz)   SpO2 95%   BMI 31.04 kg/m²   0-10 (Numeric) Pain Score: 3   3 Day Weight Change: -1.3 kg (-2 lb 13.9 oz) per day    Intake and Output    Intake/Output Summary (Last 24 hours) at 2/16/2025 1420  Last data filed at 2/16/2025 0600  Gross per 24 hour   Intake 880 ml   Output 1895 ml   Net -1015 ml       Physical Exam  Physical Exam  Constitutional:       General: He is not in acute distress.     Appearance: He is not toxic-appearing.   HENT:      Nose: Nose normal.      Mouth/Throat:      Mouth: Mucous membranes are dry.   Eyes:      Pupils: Pupils are equal, round, and reactive to light.   Cardiovascular:      Rate and Rhythm: Normal rate and regular rhythm.      Pulses: Normal pulses.      Comments: Tele: SR 70's  No epicardial wires      Pulmonary:      Effort: Pulmonary effort is normal. No respiratory distress.      Comments: Right chest tube in place with minimal sersosang drainage, no air leaks with valsalvaor with cough  Abdominal:      General: Abdomen is flat. There is no distension.      Palpations: Abdomen is soft.      " Tenderness: There is no abdominal tenderness.   Musculoskeletal:         General: Normal range of motion.      Right lower leg: No edema.      Left lower leg: No edema.   Skin:     General: Skin is warm and dry.      Capillary Refill: Capillary refill takes less than 2 seconds.      Comments: Right thoractomy incision well approximated without erythema or drainage   Neurological:      General: No focal deficit present.      Mental Status: He is alert and oriented to person, place, and time. Mental status is at baseline.   Psychiatric:         Mood and Affect: Mood normal.         Behavior: Behavior normal.           Medications  Scheduled medications  acetaminophen, 650 mg, oral, q6h  aspirin, 81 mg, oral, Daily  heparin, 5,000 Units, subcutaneous, q8h  metoprolol tartrate, 25 mg, oral, BID  multivitamin with minerals, 1 tablet, oral, Daily  oxygen, , inhalation, Continuous - 02/gases  perflutren lipid microspheres, 0.5-10 mL of dilution, intravenous, Once in imaging  perflutren protein A microsphere, 0.5 mL, intravenous, Once in imaging  polyethylene glycol, 17 g, oral, BID  sennosides-docusate sodium, 2 tablet, oral, BID  sulfur hexafluoride microsphr, 2 mL, intravenous, Once in imaging    Continuous medications   PRN medications  PRN medications: bisacodyl, HYDROmorphone, naloxone, ondansetron **OR** ondansetron, oxyCODONE, oxyCODONE    Labs  Results for orders placed or performed during the hospital encounter of 02/13/25 (from the past 24 hours)   Magnesium   Result Value Ref Range    Magnesium 2.25 1.60 - 2.40 mg/dL   CBC   Result Value Ref Range    WBC 9.6 4.4 - 11.3 x10*3/uL    nRBC 0.0 0.0 - 0.0 /100 WBCs    RBC 4.21 (L) 4.50 - 5.90 x10*6/uL    Hemoglobin 12.7 (L) 13.5 - 17.5 g/dL    Hematocrit 39.2 (L) 41.0 - 52.0 %    MCV 93 80 - 100 fL    MCH 30.2 26.0 - 34.0 pg    MCHC 32.4 32.0 - 36.0 g/dL    RDW 13.5 11.5 - 14.5 %    Platelets 150 150 - 450 x10*3/uL   Renal Function Panel   Result Value Ref Range     Glucose 91 74 - 99 mg/dL    Sodium 139 136 - 145 mmol/L    Potassium 4.0 3.5 - 5.3 mmol/L    Chloride 100 98 - 107 mmol/L    Bicarbonate 30 21 - 32 mmol/L    Anion Gap 13 10 - 20 mmol/L    Urea Nitrogen 36 (H) 6 - 23 mg/dL    Creatinine 1.14 0.50 - 1.30 mg/dL    eGFR 72 >60 mL/min/1.73m*2    Calcium 9.7 8.6 - 10.6 mg/dL    Phosphorus 1.6 (L) 2.5 - 4.9 mg/dL    Albumin 3.4 3.4 - 5.0 g/dL           Imaging and diagnostics:    XR chest 1 view 02/16/2025  Impression  1.  Right-sided chest tube in place with slight interval improvement  in right apical pneumothorax. Stable bibasilar atelectasis.    XR chest 1 view 02/15/2025  IMPRESSION:  1.  Postoperative bibasilar atelectasis/small effusions.  2. There is a small right apical pneumothorax. Correlate with any  concern for air leak.    IMPRESSION & PLAN:  POD # 3 s/p Mini Right Thoracotomy, Mitral Valve Repair 36 Simuplus Band   - Increase activity/ ambulation; PT/OT  - Encourage IS, C/DB; respiratory therapy; wean O2 as guilherme   - Cardiac rehab referral   - Continue cardiac meds: ASA, BB  -  1 right pleural in place to -20cm suction; morning CXR with right sided pneumothorax with chest tube in place with no air leaks on valsalva, will keep in for now and repeat CXR -> 216 Repeat CXR slight improvement in right PTX, plan to keep right CT one more day, repeat CXR in AM and try water seal for 4 hrs follow up by CXR  - 2v CXR once chest tube is removed  - Postop echo ordered on 2/15  - NO epicardial wires  - Tele until discharge  - Optimize nutrition and electrolytes    Acute post op pain  - Scheduled tylenol and PRN oxycodone  - PRN dilaudid while chest tube in place    NSR  - 2/15 increase metoprolol to 25mg BID today  - Adjust medications as tolerated    Acute Blood Loss Anemia - stable  Recent Labs     02/16/25  0611 02/15/25  0524 02/14/25  0323 02/13/25  1223 02/03/25  1538   HGB 12.7* 13.3* 12.5* 12.4* 14.8   HCT 39.2* 38.9* 37.1* 36.4* 46.7   - MV  - DC iron today  -  Daily labs, transfuse as indicated    Thrombocytopenia - resolved  Recent Labs     02/16/25  0611 02/15/25  0524 02/14/25  0323 02/13/25  1223 02/03/25  1538    149* 136* 121* 174   - Etiology likely postop/CPB related  - Continue to trend with daily CBCs    Volume/Electrolyte Status: Preop wt Weight: 96.5 kg (212 lb 11.9 oz)   KEANU--> likely in setting of volume overload  Vitals:    02/16/25 0600   Weight: 92.6 kg (204 lb 2.3 oz)     - Weight: Pre-op 96.5 kg, 2/16 92.6kg  - Adjust diuresis as needed for postop cardiac surgery hypervolemia  - Replete electrolytes for hypokalemia/hypomagnesemia/hypophosphatemia as needed =  - Daily weights and strict I&Os  - Daily RFP while admitted  - 2/15 Lasix 40mg x2 today  - 2/16 Lasix 20 mg IV x1 dose today      VTE Prophylaxis: SCDs/TEDs, ambulation, SQ heparin  Code Status: Full Code    Dispo  - PT/OT recs 2/14 low intensity   - Would benefit from homecare for cardiac surgery carepath and RN visits  - Anticipate discharge Monday/Tuesday timeframe pending CT removal, pneumo resolution and post op testing (2v CXR)  - Will continue to assess discharge needs      DENISE Stewart-CNP  Cardiac Surgery [facility]  [facility]  Team Phone [phone]    2/16/2025  2:20 PM

## 2025-02-16 NOTE — HOSPITAL COURSE
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 Valdes 2/13/25.    2/13/2025 OPERATION: by Denilson Valdes  Mini Right Thoracotomy  Mitral Valve Repair 36 Simuplus Band  Right Femoral Arterial and Venous Cannulation  Intercostal Cryo Analgesia    Echo Pre/Post: Normal BiV fxn, severe MR pre and resolved post  Chest Tubes/Drains: R pleural x1   Temporary wires location/setting: N/A    CTICU course: uneventful    Transferred to the floor 2/14    ========================================    Floor Course:  - Patient was diuresed for fluid volume overload post cardiac surgery; Preop weight: ***kg, discharge wt: ***kg  - On ASA, statin, BB, *** by discharge  - Epicardial wires {cut / pull epicardial wires:54001} on ***  - Telemetry at discharge ***  - 2v CXR done ***  - Postop echo done ***  - Postop CTA done ***  - Cardiac rehab referral was placed  - PT recs {PT recs for dc:12261}  - Anticipate discharge to {discharge disposition:04304}    Discharged on ***      On day of discharge, vital signs were stable and no acute distress was noted. All questions were answered. After VS and labs were reviewed it was determined the patient was stable for discharge.   Hospital day of discharge management- spent >30 minutes coordinating the discharge and counseling/educating patient and family regarding discharge instructions.     ========================================    Past Medical History:  Diagnosis Date  · Basal cell carcinoma    · Hiatal hernia    · Hypertension    · Kidney stone 10/17/2023    follows with urology  · Nephrolithiasis    · Severe mitral regurgitation    · Viral URI 01/07/2025    patient states that he has a dry, NP cough the last 5 weeks       Surgical History  Past Surgical History:  Procedure Laterality Date  · CARDIAC CATHETERIZATION   10/30/2024  · COLONOSCOPY      · KIDNEY STONE  SURGERY           Prescriptions Prior to Admission  Medications Prior to Admission  Medication Sig Dispense Refill Last Dose/Taking  · ascorbic acid (Vitamin C) 1,000 mg tablet Take 1 tablet (1,000 mg) by mouth once daily.     Past Week  · chlorhexidine (Hibiclens) 4 % external liquid Use as directed daily preoperatively 473 mL 0 2/13/2025 Morning  · chlorhexidine (Peridex) 0.12 % solution Swish and spit with 15ml of solution the night before and morning of surgery. Do not swallow. 15 mL 0 2/13/2025 Morning  · lisinopril 20 mg tablet Take 1 tablet (20 mg) by mouth once daily.     Past Week       Patient has no known allergies.  Social History  Social History       Tobacco Use  · Smoking status: Never  · Smokeless tobacco: Never  Vaping Use  · Vaping status: Never Used  Substance Use Topics  · Alcohol use: Yes      Comment: Ocassionally  · Drug use: Never

## 2025-02-17 ENCOUNTER — APPOINTMENT (OUTPATIENT)
Dept: RADIOLOGY | Facility: HOSPITAL | Age: 65
DRG: 219 | End: 2025-02-17
Payer: COMMERCIAL

## 2025-02-17 ENCOUNTER — APPOINTMENT (OUTPATIENT)
Dept: CARDIOLOGY | Facility: HOSPITAL | Age: 65
DRG: 219 | End: 2025-02-17
Payer: COMMERCIAL

## 2025-02-17 LAB
ALBUMIN SERPL BCP-MCNC: 3.7 G/DL (ref 3.4–5)
ANION GAP SERPL CALC-SCNC: 12 MMOL/L (ref 10–20)
BUN SERPL-MCNC: 31 MG/DL (ref 6–23)
CALCIUM SERPL-MCNC: 9.8 MG/DL (ref 8.6–10.6)
CHLORIDE SERPL-SCNC: 101 MMOL/L (ref 98–107)
CO2 SERPL-SCNC: 31 MMOL/L (ref 21–32)
CREAT SERPL-MCNC: 0.99 MG/DL (ref 0.5–1.3)
EGFRCR SERPLBLD CKD-EPI 2021: 85 ML/MIN/1.73M*2
ERYTHROCYTE [DISTWIDTH] IN BLOOD BY AUTOMATED COUNT: 13.4 % (ref 11.5–14.5)
GLUCOSE SERPL-MCNC: 100 MG/DL (ref 74–99)
HCT VFR BLD AUTO: 40.7 % (ref 41–52)
HGB BLD-MCNC: 12.9 G/DL (ref 13.5–17.5)
MAGNESIUM SERPL-MCNC: 2.29 MG/DL (ref 1.6–2.4)
MCH RBC QN AUTO: 29.6 PG (ref 26–34)
MCHC RBC AUTO-ENTMCNC: 31.7 G/DL (ref 32–36)
MCV RBC AUTO: 93 FL (ref 80–100)
NRBC BLD-RTO: 0 /100 WBCS (ref 0–0)
PHOSPHATE SERPL-MCNC: 1.8 MG/DL (ref 2.5–4.9)
PLATELET # BLD AUTO: 179 X10*3/UL (ref 150–450)
POTASSIUM SERPL-SCNC: 3.8 MMOL/L (ref 3.5–5.3)
RBC # BLD AUTO: 4.36 X10*6/UL (ref 4.5–5.9)
SODIUM SERPL-SCNC: 140 MMOL/L (ref 136–145)
WBC # BLD AUTO: 7.9 X10*3/UL (ref 4.4–11.3)

## 2025-02-17 PROCEDURE — XXE2X19 MEASUREMENT OF CARDIAC OUTPUT, COMPUTER-AIDED ASSESSMENT, NEW TECHNOLOGY GROUP 9: ICD-10-PCS | Performed by: THORACIC SURGERY (CARDIOTHORACIC VASCULAR SURGERY)

## 2025-02-17 PROCEDURE — 2500000004 HC RX 250 GENERAL PHARMACY W/ HCPCS (ALT 636 FOR OP/ED): Performed by: NURSE PRACTITIONER

## 2025-02-17 PROCEDURE — 0932T N-INVS DET HRT FAIL AUG ECHO: CPT

## 2025-02-17 PROCEDURE — 83735 ASSAY OF MAGNESIUM: CPT | Performed by: NURSE PRACTITIONER

## 2025-02-17 PROCEDURE — 71045 X-RAY EXAM CHEST 1 VIEW: CPT | Performed by: RADIOLOGY

## 2025-02-17 PROCEDURE — 2500000002 HC RX 250 W HCPCS SELF ADMINISTERED DRUGS (ALT 637 FOR MEDICARE OP, ALT 636 FOR OP/ED)

## 2025-02-17 PROCEDURE — 99232 SBSQ HOSP IP/OBS MODERATE 35: CPT

## 2025-02-17 PROCEDURE — 71045 X-RAY EXAM CHEST 1 VIEW: CPT

## 2025-02-17 PROCEDURE — 80069 RENAL FUNCTION PANEL: CPT | Performed by: NURSE PRACTITIONER

## 2025-02-17 PROCEDURE — 36415 COLL VENOUS BLD VENIPUNCTURE: CPT | Performed by: NURSE PRACTITIONER

## 2025-02-17 PROCEDURE — 2500000001 HC RX 250 WO HCPCS SELF ADMINISTERED DRUGS (ALT 637 FOR MEDICARE OP): Performed by: PHYSICIAN ASSISTANT

## 2025-02-17 PROCEDURE — 2500000001 HC RX 250 WO HCPCS SELF ADMINISTERED DRUGS (ALT 637 FOR MEDICARE OP)

## 2025-02-17 PROCEDURE — 1100000001 HC PRIVATE ROOM DAILY

## 2025-02-17 PROCEDURE — 2500000004 HC RX 250 GENERAL PHARMACY W/ HCPCS (ALT 636 FOR OP/ED)

## 2025-02-17 PROCEDURE — 02UG0JZ SUPPLEMENT MITRAL VALVE WITH SYNTHETIC SUBSTITUTE, OPEN APPROACH: ICD-10-PCS | Performed by: THORACIC SURGERY (CARDIOTHORACIC VASCULAR SURGERY)

## 2025-02-17 PROCEDURE — 5A1221Z PERFORMANCE OF CARDIAC OUTPUT, CONTINUOUS: ICD-10-PCS | Performed by: THORACIC SURGERY (CARDIOTHORACIC VASCULAR SURGERY)

## 2025-02-17 PROCEDURE — 94668 MNPJ CHEST WALL SBSQ: CPT

## 2025-02-17 PROCEDURE — 2500000001 HC RX 250 WO HCPCS SELF ADMINISTERED DRUGS (ALT 637 FOR MEDICARE OP): Performed by: NURSE PRACTITIONER

## 2025-02-17 PROCEDURE — 85027 COMPLETE CBC AUTOMATED: CPT | Performed by: NURSE PRACTITIONER

## 2025-02-17 RX ORDER — AMOXICILLIN 250 MG
2 CAPSULE ORAL AS NEEDED
COMMUNITY
Start: 2025-02-17 | End: 2025-02-20

## 2025-02-17 RX ORDER — POTASSIUM CHLORIDE 20 MEQ/1
20 TABLET, EXTENDED RELEASE ORAL ONCE
Status: COMPLETED | OUTPATIENT
Start: 2025-02-17 | End: 2025-02-17

## 2025-02-17 RX ORDER — LOSARTAN POTASSIUM 25 MG/1
12.5 TABLET ORAL DAILY
Status: DISCONTINUED | OUTPATIENT
Start: 2025-02-17 | End: 2025-02-18 | Stop reason: HOSPADM

## 2025-02-17 RX ORDER — FUROSEMIDE 10 MG/ML
20 INJECTION INTRAMUSCULAR; INTRAVENOUS ONCE
Status: COMPLETED | OUTPATIENT
Start: 2025-02-17 | End: 2025-02-17

## 2025-02-17 RX ORDER — LOSARTAN POTASSIUM 25 MG/1
12.5 TABLET ORAL DAILY
Qty: 15 TABLET | Refills: 0 | Status: SHIPPED | OUTPATIENT
Start: 2025-02-18 | End: 2025-03-20

## 2025-02-17 RX ORDER — OXYCODONE HYDROCHLORIDE 5 MG/1
5 TABLET ORAL EVERY 6 HOURS PRN
Qty: 12 TABLET | Refills: 0 | Status: SHIPPED | OUTPATIENT
Start: 2025-02-17 | End: 2025-02-21

## 2025-02-17 RX ORDER — POLYETHYLENE GLYCOL 3350 17 G/17G
17 POWDER, FOR SOLUTION ORAL AS NEEDED
COMMUNITY
Start: 2025-02-17 | End: 2025-02-20

## 2025-02-17 RX ORDER — ACETAMINOPHEN 325 MG/1
650 TABLET ORAL EVERY 6 HOURS PRN
COMMUNITY
Start: 2025-02-17 | End: 2025-02-20

## 2025-02-17 RX ORDER — ASPIRIN 81 MG/1
81 TABLET ORAL DAILY
COMMUNITY
Start: 2025-02-18 | End: 2025-03-20

## 2025-02-17 RX ORDER — MULTIVIT-MIN/IRON FUM/FOLIC AC 7.5 MG-4
1 TABLET ORAL DAILY
Qty: 30 TABLET | Refills: 0 | Status: SHIPPED | OUTPATIENT
Start: 2025-02-18 | End: 2025-03-20

## 2025-02-17 RX ORDER — METOPROLOL TARTRATE 25 MG/1
25 TABLET, FILM COATED ORAL 2 TIMES DAILY
Qty: 60 TABLET | Refills: 0 | Status: SHIPPED | OUTPATIENT
Start: 2025-02-17 | End: 2025-03-20

## 2025-02-17 RX ADMIN — HEPARIN SODIUM 5000 UNITS: 5000 INJECTION, SOLUTION INTRAVENOUS; SUBCUTANEOUS at 08:11

## 2025-02-17 RX ADMIN — ACETAMINOPHEN 650 MG: 325 TABLET ORAL at 16:23

## 2025-02-17 RX ADMIN — HEPARIN SODIUM 5000 UNITS: 5000 INJECTION, SOLUTION INTRAVENOUS; SUBCUTANEOUS at 16:23

## 2025-02-17 RX ADMIN — ACETAMINOPHEN 650 MG: 325 TABLET ORAL at 00:12

## 2025-02-17 RX ADMIN — METOPROLOL TARTRATE 25 MG: 25 TABLET, FILM COATED ORAL at 08:11

## 2025-02-17 RX ADMIN — OXYCODONE 5 MG: 5 TABLET ORAL at 08:15

## 2025-02-17 RX ADMIN — ASPIRIN 81 MG: 81 TABLET, COATED ORAL at 08:11

## 2025-02-17 RX ADMIN — ONDANSETRON 4 MG: 2 INJECTION INTRAMUSCULAR; INTRAVENOUS at 08:15

## 2025-02-17 RX ADMIN — HEPARIN SODIUM 5000 UNITS: 5000 INJECTION, SOLUTION INTRAVENOUS; SUBCUTANEOUS at 00:12

## 2025-02-17 RX ADMIN — ACETAMINOPHEN 650 MG: 325 TABLET ORAL at 05:44

## 2025-02-17 RX ADMIN — SENNOSIDES AND DOCUSATE SODIUM 2 TABLET: 50; 8.6 TABLET ORAL at 21:31

## 2025-02-17 RX ADMIN — SENNOSIDES AND DOCUSATE SODIUM 2 TABLET: 50; 8.6 TABLET ORAL at 08:11

## 2025-02-17 RX ADMIN — POTASSIUM CHLORIDE 20 MEQ: 1500 TABLET, EXTENDED RELEASE ORAL at 09:48

## 2025-02-17 RX ADMIN — METOPROLOL TARTRATE 25 MG: 25 TABLET, FILM COATED ORAL at 21:31

## 2025-02-17 RX ADMIN — FUROSEMIDE 20 MG: 10 INJECTION, SOLUTION INTRAVENOUS at 09:48

## 2025-02-17 RX ADMIN — LOSARTAN POTASSIUM 12.5 MG: 25 TABLET, FILM COATED ORAL at 16:23

## 2025-02-17 RX ADMIN — POLYETHYLENE GLYCOL 3350 17 G: 17 POWDER, FOR SOLUTION ORAL at 21:31

## 2025-02-17 RX ADMIN — Medication 1 TABLET: at 08:11

## 2025-02-17 SDOH — ECONOMIC STABILITY: HOUSING INSECURITY
IN THE LAST 12 MONTHS, WAS THERE A TIME WHEN YOU DID NOT HAVE A STEADY PLACE TO SLEEP OR SLEPT IN A SHELTER (INCLUDING NOW)?: NO

## 2025-02-17 SDOH — ECONOMIC STABILITY: TRANSPORTATION INSECURITY
IN THE PAST 12 MONTHS, HAS LACK OF TRANSPORTATION KEPT YOU FROM MEETINGS, WORK, OR FROM GETTING THINGS NEEDED FOR DAILY LIVING?: NO

## 2025-02-17 SDOH — ECONOMIC STABILITY: TRANSPORTATION INSECURITY: IN THE PAST 12 MONTHS, HAS LACK OF TRANSPORTATION KEPT YOU FROM MEDICAL APPOINTMENTS OR FROM GETTING MEDICATIONS?: NO

## 2025-02-17 SDOH — ECONOMIC STABILITY: HOUSING INSECURITY

## 2025-02-17 SDOH — ECONOMIC STABILITY: TRANSPORTATION INSECURITY

## 2025-02-17 SDOH — ECONOMIC STABILITY: INCOME INSECURITY: IN THE LAST 12 MONTHS, WAS THERE A TIME WHEN YOU WERE NOT ABLE TO PAY THE MORTGAGE OR RENT ON TIME?: NO

## 2025-02-17 SDOH — ECONOMIC STABILITY: FOOD INSECURITY: WITHIN THE PAST 12 MONTHS, YOU WORRIED THAT YOUR FOOD WOULD RUN OUT BEFORE YOU GOT MONEY TO BUY MORE.: NEVER TRUE

## 2025-02-17 SDOH — ECONOMIC STABILITY: FOOD INSECURITY: WITHIN THE PAST 12 MONTHS, YOU WORRIED THAT YOUR FOOD WOULD RUN OUT BEFORE YOU GOT THE MONEY TO BUY MORE.: NEVER TRUE

## 2025-02-17 SDOH — ECONOMIC STABILITY: FOOD INSECURITY: WITHIN THE PAST 12 MONTHS, THE FOOD YOU BOUGHT JUST DIDN'T LAST AND YOU DIDN'T HAVE MONEY TO GET MORE.: NEVER TRUE

## 2025-02-17 SDOH — ECONOMIC STABILITY: HOUSING INSECURITY: IN THE LAST 12 MONTHS, HOW MANY PLACES HAVE YOU LIVED?: 1

## 2025-02-17 SDOH — ECONOMIC STABILITY: FOOD INSECURITY

## 2025-02-17 SDOH — ECONOMIC STABILITY: GENERAL

## 2025-02-17 SDOH — ECONOMIC STABILITY: TRANSPORTATION INSECURITY
IN THE PAST 12 MONTHS, HAS THE LACK OF TRANSPORTATION KEPT YOU FROM MEDICAL APPOINTMENTS OR FROM GETTING MEDICATIONS?: NO

## 2025-02-17 SDOH — ECONOMIC STABILITY: HOUSING INSECURITY: IN THE PAST 12 MONTHS HAS THE ELECTRIC, GAS, OIL, OR WATER COMPANY THREATENED TO SHUT OFF SERVICES IN YOUR HOME?: NO

## 2025-02-17 SDOH — ECONOMIC STABILITY: HOUSING INSECURITY: IN THE LAST 12 MONTHS, WAS THERE A TIME WHEN YOU WERE NOT ABLE TO PAY THE MORTGAGE OR RENT ON TIME?: NO

## 2025-02-17 ASSESSMENT — COGNITIVE AND FUNCTIONAL STATUS - GENERAL
CLIMB 3 TO 5 STEPS WITH RAILING: A LITTLE
DAILY ACTIVITIY SCORE: 24
MOBILITY SCORE: 23
DAILY ACTIVITIY SCORE: 24
CLIMB 3 TO 5 STEPS WITH RAILING: A LITTLE
MOBILITY SCORE: 23

## 2025-02-17 ASSESSMENT — PAIN SCALES - GENERAL
PAINLEVEL_OUTOF10: 0 - NO PAIN
PAINLEVEL_OUTOF10: 2
PAINLEVEL_OUTOF10: 5 - MODERATE PAIN
PAINLEVEL_OUTOF10: 0 - NO PAIN
PAINLEVEL_OUTOF10: 4
PAINLEVEL_OUTOF10: 0 - NO PAIN
PAINLEVEL_OUTOF10: 3

## 2025-02-17 ASSESSMENT — PAIN DESCRIPTION - ORIENTATION
ORIENTATION: RIGHT

## 2025-02-17 ASSESSMENT — PAIN DESCRIPTION - LOCATION
LOCATION: RIB CAGE

## 2025-02-17 ASSESSMENT — PAIN - FUNCTIONAL ASSESSMENT
PAIN_FUNCTIONAL_ASSESSMENT: 0-10
PAIN_FUNCTIONAL_ASSESSMENT: 0-10

## 2025-02-17 ASSESSMENT — SOCIAL DETERMINANTS OF HEALTH (SDOH): IN THE PAST 12 MONTHS, HAS THE ELECTRIC, GAS, OIL, OR WATER COMPANY THREATENED TO SHUT OFF SERVICE IN YOUR HOME?: NO

## 2025-02-17 ASSESSMENT — ACTIVITIES OF DAILY LIVING (ADL): LACK_OF_TRANSPORTATION: NO

## 2025-02-17 NOTE — PROGRESS NOTES
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 Valdes 2/13/25.       OPERATION/PROCEDURE: 2/13/25 with Denilson Valdes MD  Mini Right Thoracotomy, Mitral Valve Repair 36 Simuplus Band, Right Femoral Arterial and Venous Cannulation, Intercostal Cryo Analgesia     CTICU Course: uneventful    Transferred to T3 on 2/14/25    Interval History:   No acute events reported    SUBJECTIVE:  Patient seen and examined today. Tele shows SR 70's. On RA. Right pleural CT removed today. Patient denies any complaints. Planning to discharge in AM    Objective   /78 (BP Location: Right arm, Patient Position: Sitting)   Pulse 80   Temp 37 °C (98.6 °F) (Temporal)   Resp 18   Ht 1.727 m (5' 8\")   Wt 92 kg (202 lb 12.8 oz)   SpO2 93%   BMI 30.84 kg/m²   0-10 (Numeric) Pain Score: 0 - No pain   3 Day Weight Change: -1.503 kg (-3 lb 5 oz) per day    Intake and Output    Intake/Output Summary (Last 24 hours) at 2/17/2025 1450  Last data filed at 2/17/2025 0855  Gross per 24 hour   Intake 600 ml   Output 510 ml   Net 90 ml       Physical Exam  Physical Exam  Constitutional:       General: He is not in acute distress.     Appearance: He is not toxic-appearing.   HENT:      Nose: Nose normal.      Mouth/Throat:      Mouth: Mucous membranes are moist.   Eyes:      Pupils: Pupils are equal, round, and reactive to light.   Cardiovascular:      Rate and Rhythm: Normal rate and regular rhythm.      Pulses: Normal pulses.      Heart sounds: Normal heart sounds.      Comments: Tele: SR 70's  No epicardial wires      Pulmonary:      Effort: Pulmonary effort is normal.      Breath sounds: Normal breath sounds.      Comments: On RA    Abdominal:      General: Abdomen is flat. There is no distension.      Palpations: Abdomen is soft.      Tenderness: There is no abdominal " tenderness.   Musculoskeletal:         General: Normal range of motion.      Right lower leg: No edema.      Left lower leg: No edema.   Skin:     General: Skin is warm and dry.      Capillary Refill: Capillary refill takes less than 2 seconds.      Comments: Right thoractomy incision well approximated without erythema or drainage   Neurological:      General: No focal deficit present.      Mental Status: He is alert and oriented to person, place, and time. Mental status is at baseline.   Psychiatric:         Mood and Affect: Mood normal.         Behavior: Behavior normal.           Medications  Scheduled medications  acetaminophen, 650 mg, oral, q6h  aspirin, 81 mg, oral, Daily  heparin, 5,000 Units, subcutaneous, q8h  metoprolol tartrate, 25 mg, oral, BID  multivitamin with minerals, 1 tablet, oral, Daily  oxygen, , inhalation, Continuous - 02/gases  perflutren lipid microspheres, 0.5-10 mL of dilution, intravenous, Once in imaging  perflutren protein A microsphere, 0.5 mL, intravenous, Once in imaging  polyethylene glycol, 17 g, oral, BID  sennosides-docusate sodium, 2 tablet, oral, BID  sulfur hexafluoride microsphr, 2 mL, intravenous, Once in imaging    Continuous medications   PRN medications  PRN medications: bisacodyl, HYDROmorphone, naloxone, ondansetron **OR** ondansetron, oxyCODONE, oxyCODONE    Labs  Results for orders placed or performed during the hospital encounter of 02/13/25 (from the past 24 hours)   Magnesium   Result Value Ref Range    Magnesium 2.29 1.60 - 2.40 mg/dL   CBC   Result Value Ref Range    WBC 7.9 4.4 - 11.3 x10*3/uL    nRBC 0.0 0.0 - 0.0 /100 WBCs    RBC 4.36 (L) 4.50 - 5.90 x10*6/uL    Hemoglobin 12.9 (L) 13.5 - 17.5 g/dL    Hematocrit 40.7 (L) 41.0 - 52.0 %    MCV 93 80 - 100 fL    MCH 29.6 26.0 - 34.0 pg    MCHC 31.7 (L) 32.0 - 36.0 g/dL    RDW 13.4 11.5 - 14.5 %    Platelets 179 150 - 450 x10*3/uL   Renal Function Panel   Result Value Ref Range    Glucose 100 (H) 74 - 99 mg/dL     Sodium 140 136 - 145 mmol/L    Potassium 3.8 3.5 - 5.3 mmol/L    Chloride 101 98 - 107 mmol/L    Bicarbonate 31 21 - 32 mmol/L    Anion Gap 12 10 - 20 mmol/L    Urea Nitrogen 31 (H) 6 - 23 mg/dL    Creatinine 0.99 0.50 - 1.30 mg/dL    eGFR 85 >60 mL/min/1.73m*2    Calcium 9.8 8.6 - 10.6 mg/dL    Phosphorus 1.8 (L) 2.5 - 4.9 mg/dL    Albumin 3.7 3.4 - 5.0 g/dL           Imaging and diagnostics:    XR chest 1 view 02/16/2025  Impression  1.  Right-sided chest tube in place with slight interval improvement  in right apical pneumothorax. Stable bibasilar atelectasis.    XR chest 1 view 02/15/2025  IMPRESSION:  1.  Postoperative bibasilar atelectasis/small effusions.  2. There is a small right apical pneumothorax. Correlate with any  concern for air leak.    IMPRESSION & PLAN:  POD # 4 s/p Mini Right Thoracotomy, Mitral Valve Repair 36 Simuplus Band   - Increase activity/ ambulation; PT/OT  - Encourage IS, C/DB; respiratory therapy; wean O2 as guilherme   - Cardiac rehab referral   - Continue cardiac meds: ASA, BB  -  1 right pleural in place to -20cm suction; morning CXR with right sided pneumothorax with chest tube in place with no air leaks on valsalva, will keep in for now and repeat CXR -> 216 Repeat CXR slight improvement in right PTX, plan to keep right CT one more day, repeat CXR in AM and try water seal for 4 hrs follow up by CXR  - 2/17 Right pleural CT removed today  - 2/17 2v CXR ordered for AM  - Postop echo done 2/17  - NO epicardial wires  - Tele until discharge  - Optimize nutrition and electrolytes    Acute post op pain  - Scheduled tylenol and PRN oxycodone  - PRN dilaudid while chest tube in place  - 2/17 stop IV dilaudid     NSR  - 2/15 increase metoprolol to 25mg BID today  - 2/17 Cont Metoprolol 25 mg bid   - Adjust medications as tolerated    Acute Blood Loss Anemia - stable  Recent Labs     02/17/25  0519 02/16/25  0611 02/15/25  0524 02/14/25  0323 02/13/25  1223 02/03/25  1538   HGB 12.9* 12.7* 13.3*  12.5* 12.4* 14.8   HCT 40.7* 39.2* 38.9* 37.1* 36.4* 46.7   - Cont MV  - DC iron today  - Daily labs, transfuse as indicated    Thrombocytopenia - resolved  Recent Labs     25  0519 25  0611 02/15/25  0524 25  0323 25  1223 25  1538    150 149* 136* 121* 174   - Etiology likely postop/CPB related  - Continue to trend with daily CBCs    Volume/Electrolyte Status: Preop wt Weight: 96.5 kg (212 lb 11.9 oz)   KEANU--> likely in setting of volume overload  Vitals:    25 0546   Weight: 92 kg (202 lb 12.8 oz)     - Weight: Pre-op 96.5 kg,  92 kg  - Adjust diuresis as needed for postop cardiac surgery hypervolemia  - Replete electrolytes for hypokalemia/hypomagnesemia/hypophosphatemia as needed;  replaced K  - Daily weights and strict I&Os  - Daily RFP while admitted  - 2/15 Lasix 40mg x2 today  -  Lasix 20 mg IV x1 dose today  -  Lasix 20 mg IV x1 dose -> Weaned 02 2L to RA    Hypertension: home meds: Lisinopril 20 mg every day   Systolic (24hrs), Av , Min:112 , Max:127   - continue Metoprolol tartrate 25 mg bid   -  Patient is on lisinopril 20 mg every day outpatient, patient reported to significant cough on ACE-I, will start losartan 25 mg every day instead of his home ACE-I therapy   - additional antihypertensives as needed     VTE Prophylaxis: SCDs/TEDs, ambulation, SQ heparin  Code Status: Full Code    Dispo  - PT/OT recs  low intensity   - Would benefit from homecare for cardiac surgery carepath and RN visits  - Anticipate discharge AM pending post op 2V CXR and clinical progression  - Will continue to assess discharge needs    DENISE Stewart-CNP  Cardiac Surgery [facility]  [facility]  Team Phone [phone]    2025  2:50 PM

## 2025-02-17 NOTE — CARE PLAN
The patient's goals for the shift include  rest    The clinical goals for the shift include patient will remain HDS throughout shift        Problem: Safety - Medical Restraint  Goal: Remains free of injury from restraints (Restraint for Interference with Medical Device)  Outcome: Progressing  Goal: Free from restraint(s) (Restraint for Interference with Medical Device)  Outcome: Progressing     Problem: Safety - Adult  Goal: Free from fall injury  Outcome: Progressing     Problem: Discharge Planning  Goal: Discharge to home or other facility with appropriate resources  Outcome: Progressing     Problem: Chronic Conditions and Co-morbidities  Goal: Patient's chronic conditions and co-morbidity symptoms are monitored and maintained or improved  Outcome: Progressing     Problem: Nutrition  Goal: Nutrient intake appropriate for maintaining nutritional needs  Outcome: Progressing     Problem: Skin  Goal: Decreased wound size/increased tissue granulation at next dressing change  Outcome: Progressing  Goal: Participates in plan/prevention/treatment measures  Outcome: Progressing  Goal: Prevent/manage excess moisture  Outcome: Progressing  Goal: Prevent/minimize sheer/friction injuries  Outcome: Progressing  Goal: Promote/optimize nutrition  Outcome: Progressing  Goal: Promote skin healing  Outcome: Progressing     Problem: Fall/Injury  Goal: Not fall by end of shift  Outcome: Progressing  Goal: Be free from injury by end of the shift  Outcome: Progressing  Goal: Verbalize understanding of personal risk factors for fall in the hospital  Outcome: Progressing  Goal: Verbalize understanding of risk factor reduction measures to prevent injury from fall in the home  Outcome: Progressing  Goal: Use assistive devices by end of the shift  Outcome: Progressing  Goal: Pace activities to prevent fatigue by end of the shift  Outcome: Progressing

## 2025-02-17 NOTE — SIGNIFICANT EVENT
Chest tube was clamped overnight and suction was to water seal. Morning CXR showed improvement in right PTX. Right CT unclamped, no air leaks with valsalva maneuver or with coughing.  1 right pleural chest tube removed without difficulty. Patient tolerated well. Stat 1V CXR ordered.    Lucretia Ruelas, APRN-CNP  Cardiac Surgery [facility]  [facility]  Team Pager [phone]

## 2025-02-17 NOTE — DISCHARGE INSTRUCTIONS
Don't forget to “Keep Your Move in the Tube!!”     Please refer to the “Move in the Tube” handout.  -- Load bearing activities can be completed if you are “staying in the tube.” If you are attempting load bearing activities, let pain be your guide with when trying an activity “out of the tube”. If an activity hurts or is uncomfortable go back to doing it while you stay “in the tube”. There is no time limit to “stay in the tube”.     -- Non-load bearing activities, which are your activities of daily living, can be completed with your arms “out of the tube” as long as you remain pain free. Some activities of daily living examples are dressing, personal care, showering, washing hair, and toilet hygiene.     Don't forget to KEEP YOUR MOVE IN THE TUBE and think of a JOE Russell dinosaur!                                       **IMPORTANT**  Prevention of Infective Endocarditis (Heart Infections) After Cardiac Surgery:    Infective endocarditis occurs when bacteria enters the bloodstream and then attaches to the heart. Patients with a new heart valve, repaired heart valve, or graft used to repair/replace their aorta are at higher risk for developing this because of the prosthetic (man-made) medical material in their heart. Endocarditis is rare but when you undergo certain medical or dental procedures, as listed below, it can give bacteria an opportunity to enter the blood and cause this type of infection. To prevent this, preventative antibiotics taken before these procedures are required.     Antibiotics are always required PRIOR to the following:  - Dental work with gingival, periapical, or other gingival perforation (such as tooth extractions, dental abscess drainage, and dental cleanings)  - Respiratory procedures with incisions or biopsies   - Cardiac or vascular procedures to place or replace prosthetic material (such as heart valves, stents, etc.)    Antibiotics are required in the following situations ONLY if an  infection is already present:  - Skin or soft tissue procedures with infected tissue  - Gastrointestinal procedures with GI infections  - Urinary or genital procedure with acute genitourinary infections    Antibiotic examples you should expect to be prescribed:  - Amoxicillin or Ampicillin are usually the  first choice  - Cephalexin, Clindamycin, or Vancomycin may be used if you are unable to tolerate/allergic to Amoxicillin or Ampicillin  - Amoxicillin or Ampicillin (if allergic, use Vancomycin) will cover for enterococcus if you have a known acute biliary tract infection   - Specific antibiotics for a known organism should be used when treating an active infection    Please notify your dentist/primary physician/cardiologist PRIOR to these types of procedures to obtain the proper antibiotics and prevent a serious infection in your heart. When in doubt, always ask!   Additionally, good oral hygiene (routine brushing, flossing, and dental care) and prompt treatment of other infections (such as UTIs and skin infections) are equally as important to prevent endocarditis!!          Additional Post-Operative Instructions:  - Remember to use your Incentive spirometer 10x/hr while awake. Remember to cough and deep breath.  - No NSAIDs (common over-the-counter NSAIDs are ibuprofen/Motrin/Advil, naproxen/Naprosyn/Aleve) for 3 months after cardiac surgery; if NSAIDs needed after 3 months, clear use with cardiologist before starting.       Your home medications may have changed after surgery. Carefully compare this list with your prescription bottles at home and set aside any medications you are told to not take so you do not confuse them. Do not dispose of any medications until your follow-up, since your doctors may restart some at your follow-up appointments. It is important to bring a complete, current list of your medications to any medical appointments or hospitalizations.    For any questions about your discharge, please  call the cardiac surgery office at [phone]

## 2025-02-18 ENCOUNTER — PHARMACY VISIT (OUTPATIENT)
Dept: PHARMACY | Facility: CLINIC | Age: 65
End: 2025-02-18
Payer: COMMERCIAL

## 2025-02-18 ENCOUNTER — TELEPHONE (OUTPATIENT)
Dept: HOME HEALTH SERVICES | Facility: HOME HEALTH | Age: 65
End: 2025-02-18

## 2025-02-18 ENCOUNTER — APPOINTMENT (OUTPATIENT)
Dept: RADIOLOGY | Facility: HOSPITAL | Age: 65
DRG: 219 | End: 2025-02-18
Payer: COMMERCIAL

## 2025-02-18 VITALS
OXYGEN SATURATION: 93 % | WEIGHT: 202.3 LBS | TEMPERATURE: 98.1 F | HEIGHT: 68 IN | RESPIRATION RATE: 18 BRPM | BODY MASS INDEX: 30.66 KG/M2 | DIASTOLIC BLOOD PRESSURE: 90 MMHG | HEART RATE: 83 BPM | SYSTOLIC BLOOD PRESSURE: 145 MMHG

## 2025-02-18 DIAGNOSIS — S27.0XXS TRAUMATIC PNEUMOTHORAX, SEQUELA: ICD-10-CM

## 2025-02-18 DIAGNOSIS — Z98.890 STATUS POST MITRAL VALVE REPAIR: ICD-10-CM

## 2025-02-18 PROBLEM — I34.0 MITRAL VALVE INSUFFICIENCY, UNSPECIFIED ETIOLOGY: Status: RESOLVED | Noted: 2025-02-13 | Resolved: 2025-02-18

## 2025-02-18 PROBLEM — I34.0 MR (MITRAL REGURGITATION): Status: RESOLVED | Noted: 2024-12-02 | Resolved: 2025-02-18

## 2025-02-18 LAB
ALBUMIN SERPL BCP-MCNC: 3.4 G/DL (ref 3.4–5)
ANION GAP SERPL CALC-SCNC: 14 MMOL/L (ref 10–20)
BUN SERPL-MCNC: 25 MG/DL (ref 6–23)
CALCIUM SERPL-MCNC: 10 MG/DL (ref 8.6–10.6)
CHLORIDE SERPL-SCNC: 102 MMOL/L (ref 98–107)
CO2 SERPL-SCNC: 28 MMOL/L (ref 21–32)
CREAT SERPL-MCNC: 0.99 MG/DL (ref 0.5–1.3)
EGFRCR SERPLBLD CKD-EPI 2021: 85 ML/MIN/1.73M*2
ERYTHROCYTE [DISTWIDTH] IN BLOOD BY AUTOMATED COUNT: 13.2 % (ref 11.5–14.5)
GLUCOSE SERPL-MCNC: 101 MG/DL (ref 74–99)
HCT VFR BLD AUTO: 42 % (ref 41–52)
HGB BLD-MCNC: 13.7 G/DL (ref 13.5–17.5)
MAGNESIUM SERPL-MCNC: 2.28 MG/DL (ref 1.6–2.4)
MCH RBC QN AUTO: 29.8 PG (ref 26–34)
MCHC RBC AUTO-ENTMCNC: 32.6 G/DL (ref 32–36)
MCV RBC AUTO: 92 FL (ref 80–100)
NRBC BLD-RTO: 0 /100 WBCS (ref 0–0)
PHOSPHATE SERPL-MCNC: 2.6 MG/DL (ref 2.5–4.9)
PLATELET # BLD AUTO: 222 X10*3/UL (ref 150–450)
POTASSIUM SERPL-SCNC: 4.7 MMOL/L (ref 3.5–5.3)
RBC # BLD AUTO: 4.59 X10*6/UL (ref 4.5–5.9)
SODIUM SERPL-SCNC: 139 MMOL/L (ref 136–145)
WBC # BLD AUTO: 8.2 X10*3/UL (ref 4.4–11.3)

## 2025-02-18 PROCEDURE — 2500000001 HC RX 250 WO HCPCS SELF ADMINISTERED DRUGS (ALT 637 FOR MEDICARE OP)

## 2025-02-18 PROCEDURE — 36415 COLL VENOUS BLD VENIPUNCTURE: CPT | Performed by: NURSE PRACTITIONER

## 2025-02-18 PROCEDURE — RXMED WILLOW AMBULATORY MEDICATION CHARGE

## 2025-02-18 PROCEDURE — 2500000001 HC RX 250 WO HCPCS SELF ADMINISTERED DRUGS (ALT 637 FOR MEDICARE OP): Performed by: NURSE PRACTITIONER

## 2025-02-18 PROCEDURE — 99239 HOSP IP/OBS DSCHRG MGMT >30: CPT

## 2025-02-18 PROCEDURE — 83735 ASSAY OF MAGNESIUM: CPT | Performed by: NURSE PRACTITIONER

## 2025-02-18 PROCEDURE — 2500000001 HC RX 250 WO HCPCS SELF ADMINISTERED DRUGS (ALT 637 FOR MEDICARE OP): Performed by: PHYSICIAN ASSISTANT

## 2025-02-18 PROCEDURE — 85027 COMPLETE CBC AUTOMATED: CPT | Performed by: NURSE PRACTITIONER

## 2025-02-18 PROCEDURE — 71046 X-RAY EXAM CHEST 2 VIEWS: CPT

## 2025-02-18 PROCEDURE — 80069 RENAL FUNCTION PANEL: CPT | Performed by: NURSE PRACTITIONER

## 2025-02-18 PROCEDURE — 2500000004 HC RX 250 GENERAL PHARMACY W/ HCPCS (ALT 636 FOR OP/ED): Performed by: NURSE PRACTITIONER

## 2025-02-18 PROCEDURE — 94640 AIRWAY INHALATION TREATMENT: CPT

## 2025-02-18 PROCEDURE — 94668 MNPJ CHEST WALL SBSQ: CPT

## 2025-02-18 RX ADMIN — ACETAMINOPHEN 650 MG: 325 TABLET ORAL at 05:22

## 2025-02-18 RX ADMIN — ASPIRIN 81 MG: 81 TABLET, COATED ORAL at 09:15

## 2025-02-18 RX ADMIN — LOSARTAN POTASSIUM 12.5 MG: 25 TABLET, FILM COATED ORAL at 09:15

## 2025-02-18 RX ADMIN — Medication 1 TABLET: at 09:15

## 2025-02-18 RX ADMIN — ACETAMINOPHEN 650 MG: 325 TABLET ORAL at 12:10

## 2025-02-18 RX ADMIN — SENNOSIDES AND DOCUSATE SODIUM 2 TABLET: 50; 8.6 TABLET ORAL at 09:15

## 2025-02-18 RX ADMIN — ACETAMINOPHEN 650 MG: 325 TABLET ORAL at 00:17

## 2025-02-18 RX ADMIN — HEPARIN SODIUM 5000 UNITS: 5000 INJECTION, SOLUTION INTRAVENOUS; SUBCUTANEOUS at 09:17

## 2025-02-18 RX ADMIN — HEPARIN SODIUM 5000 UNITS: 5000 INJECTION, SOLUTION INTRAVENOUS; SUBCUTANEOUS at 00:17

## 2025-02-18 RX ADMIN — METOPROLOL TARTRATE 25 MG: 25 TABLET, FILM COATED ORAL at 09:15

## 2025-02-18 ASSESSMENT — COGNITIVE AND FUNCTIONAL STATUS - GENERAL
DAILY ACTIVITIY SCORE: 24
MOBILITY SCORE: 23
CLIMB 3 TO 5 STEPS WITH RAILING: A LITTLE

## 2025-02-18 ASSESSMENT — PAIN SCALES - GENERAL
PAINLEVEL_OUTOF10: 2
PAINLEVEL_OUTOF10: 4

## 2025-02-18 ASSESSMENT — PAIN DESCRIPTION - DESCRIPTORS: DESCRIPTORS: SORE

## 2025-02-18 ASSESSMENT — PAIN - FUNCTIONAL ASSESSMENT
PAIN_FUNCTIONAL_ASSESSMENT: 0-10

## 2025-02-18 NOTE — DISCHARGE SUMMARY
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 Valdes 2/13/25.    2/13/2025 OPERATION: by Denilson Valdes  Mini Right Thoracotomy  Mitral Valve Repair 36 Simuplus Band  Right Femoral Arterial and Venous Cannulation  Intercostal Cryo Analgesia    Echo Pre/Post: Normal BiV fxn, severe MR pre and resolved post  Chest Tubes/Drains: R pleural x1   Temporary wires location/setting: N/A    CTICU course: uneventful    Transferred to the floor 2/14    ========================================    Floor Course:  - Patient was diuresed for fluid volume overload post cardiac surgery; Preop weight: 96.5 kg, discharge wt: 91.8 kg  - On ASA, statin, BB, by discharge  - No Epicardial wires   - Telemetry at discharge SR  - 2v CXR done 2/18  - Postop echo done 2/17  - Postop CTA done N/A  - Cardiac rehab referral was placed  - PT recs low intensity therapy  - Anticipate discharge to home with homecare    Discharged on 2/18      On day of discharge, vital signs were stable and no acute distress was noted. All questions were answered. After VS and labs were reviewed it was determined the patient was stable for discharge.   Hospital day of discharge management- spent >30 minutes coordinating the discharge and counseling/educating patient and family regarding discharge instructions.     ========================================    Past Medical History:  Diagnosis Date  · Basal cell carcinoma    · Hiatal hernia    · Hypertension    · Kidney stone 10/17/2023    follows with urology  · Nephrolithiasis    · Severe mitral  regurgitation    · Viral URI 01/07/2025    patient states that he has a dry, NP cough the last 5 weeks       Surgical History  Past Surgical History:  Procedure Laterality Date  · CARDIAC CATHETERIZATION   10/30/2024  · COLONOSCOPY      · KIDNEY STONE SURGERY           Prescriptions Prior to Admission  Medications Prior to Admission  Medication Sig Dispense Refill Last Dose/Taking  · ascorbic acid (Vitamin C) 1,000 mg tablet Take 1 tablet (1,000 mg) by mouth once daily.     Past Week  · chlorhexidine (Hibiclens) 4 % external liquid Use as directed daily preoperatively 473 mL 0 2/13/2025 Morning  · chlorhexidine (Peridex) 0.12 % solution Swish and spit with 15ml of solution the night before and morning of surgery. Do not swallow. 15 mL 0 2/13/2025 Morning  · lisinopril 20 mg tablet Take 1 tablet (20 mg) by mouth once daily.     Past Week       Patient has no known allergies.  Social History  Social History       Tobacco Use  · Smoking status: Never  · Smokeless tobacco: Never  Vaping Use  · Vaping status: Never Used  Substance Use Topics  · Alcohol use: Yes      Comment: Ocassionally  · Drug use: Never           Pertinent Physical Exam At Time of Discharge  Physical Exam See progress note    Home Medications     Medication List      START taking these medications     acetaminophen 325 mg tablet; Commonly known as: Tylenol; Take 2 tablets   (650 mg) by mouth every 6 hours if needed for mild pain (1 - 3) for up to   3 days.   aspirin 81 mg EC tablet; Take 1 tablet (81 mg) by mouth once daily.   losartan 25 mg tablet; Commonly known as: Cozaar; Take 0.5 tablets (12.5   mg) by mouth once daily.   metoprolol tartrate 25 mg tablet; Commonly known as: Lopressor; Take 1   tablet (25 mg) by mouth 2 times a day.   multivitamin with minerals tablet; Take 1 tablet by mouth once daily.   oxyCODONE 5 mg immediate release tablet; Commonly known as: Roxicodone;   Take 1 tablet (5 mg) by mouth every 6 hours if needed for moderate  pain (4   - 6) for up to 3 days.   polyethylene glycol 17 gram packet; Commonly known as: Glycolax,   Miralax; Take 17 g by mouth if needed (As needed two times daily for   constipation while taking narcotics) for up to 3 days.   sennosides-docusate sodium 8.6-50 mg tablet; Commonly known as:   Janett-Colace; Take 2 tablets by mouth if needed for constipation (As needed   two times daily) for up to 3 days.     CONTINUE taking these medications     ascorbic acid 1,000 mg tablet; Commonly known as: Vitamin C     STOP taking these medications     chlorhexidine 0.12 % solution; Commonly known as: Peridex   chlorhexidine 4 % external liquid; Commonly known as: Hibiclens   lisinopril 20 mg tablet       Outpatient Follow-Up  Future Appointments   Date Time Provider Department Center   3/3/2025  3:00 PM Vita Somers, APRN-CNP [id number] [facility] Araceli Ruelas APRN-CNP

## 2025-02-18 NOTE — PROGRESS NOTES
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 Valdes 2/13/25.     OPERATION/PROCEDURE: 2/13/25 with Denilson Valdes MD  Mini Right Thoracotomy, Mitral Valve Repair 36 Simuplus Band, Right Femoral Arterial and Venous Cannulation, Intercostal Cryo Analgesia     CTICU Course: uneventful    Transferred to T3 on 2/14/25    Interval History:   No acute events reported    SUBJECTIVE:  Patient seen and examined today. Tele shows SR 70's. On RA. Right pleural CT removed 2/17. Patient denies any complaints. Planning to discharge today pending 2V CXR    Objective   /80   Pulse 77   Temp 36.6 °C (97.9 °F)   Resp 18   Ht 1.727 m (5' 8\")   Wt 91.8 kg (202 lb 4.8 oz)   SpO2 93%   BMI 30.76 kg/m²   0-10 (Numeric) Pain Score: 2   3 Day Weight Change: -1.013 kg (-2 lb 3.7 oz) per day    Intake and Output    Intake/Output Summary (Last 24 hours) at 2/18/2025 0838  Last data filed at 2/18/2025 0632  Gross per 24 hour   Intake 1440 ml   Output 2300 ml   Net -860 ml       Physical Exam  Physical Exam  Constitutional:       General: He is not in acute distress.     Appearance: He is not toxic-appearing.   HENT:      Nose: Nose normal.      Mouth/Throat:      Mouth: Mucous membranes are moist.   Eyes:      Pupils: Pupils are equal, round, and reactive to light.   Cardiovascular:      Rate and Rhythm: Normal rate and regular rhythm.      Pulses: Normal pulses.      Heart sounds: Normal heart sounds.      Comments: Tele: SR 70's  No epicardial wires      Pulmonary:      Effort: Pulmonary effort is normal.      Breath sounds: Normal breath sounds.      Comments: On RA    Abdominal:      General: Abdomen is flat. There is no distension.      Palpations: Abdomen is soft.      Tenderness: There is no abdominal tenderness.   Musculoskeletal:         General: " Normal range of motion.      Right lower leg: No edema.      Left lower leg: No edema.   Skin:     General: Skin is warm and dry.      Capillary Refill: Capillary refill takes less than 2 seconds.      Comments: Right thoractomy incision well approximated without erythema or drainage   Neurological:      General: No focal deficit present.      Mental Status: He is alert and oriented to person, place, and time. Mental status is at baseline.   Psychiatric:         Mood and Affect: Mood normal.         Behavior: Behavior normal.       Medications  Scheduled medications  acetaminophen, 650 mg, oral, q6h  aspirin, 81 mg, oral, Daily  heparin, 5,000 Units, subcutaneous, q8h  losartan, 12.5 mg, oral, Daily  metoprolol tartrate, 25 mg, oral, BID  multivitamin with minerals, 1 tablet, oral, Daily  oxygen, , inhalation, Continuous - 02/gases  perflutren lipid microspheres, 0.5-10 mL of dilution, intravenous, Once in imaging  perflutren protein A microsphere, 0.5 mL, intravenous, Once in imaging  polyethylene glycol, 17 g, oral, BID  sennosides-docusate sodium, 2 tablet, oral, BID  sulfur hexafluoride microsphr, 2 mL, intravenous, Once in imaging    Continuous medications   PRN medications  PRN medications: bisacodyl, naloxone, ondansetron **OR** ondansetron, oxyCODONE, oxyCODONE    Labs  Results for orders placed or performed during the hospital encounter of 02/13/25 (from the past 24 hours)   Transthoracic Echo (TTE) Limited   Result Value Ref Range    LVOT diam 2.20 cm    MV E/A ratio 1.20     LA vol index A/L 26.7 ml/m2    Tricuspid annular plane systolic excursion 1.7 cm    LV EF 63 %    RV free wall pk S' 18.00 cm/s    LVIDd 4.77 cm    LV A4C EF 76.0          Imaging and diagnostics:    Transthoracic Echo (TTE) Limited With Doppler And Color 02/17/2025  CONCLUSIONS:  1. Left ventricular ejection fraction is normal, by visual estimate at 60-65%.  2. There is normal right ventricular global systolic function.  3. Normal  biatrial size.  4. Mitral valve s/p repair 36mm Simuplus Band 2/13/25. The mean PG is 3.6 mmHg at a HR of 82 bpm. No evidence of significant regurgitation.    XR chest 1 view 02/17/2025  Impression  1. Bilateral lower lobe subsegmental atelectasis and small left  pleural effusion  2. Small right pneumothorax similar to previous study prior to  removal of the chest tube    XR chest 1 view 02/16/2025  Impression  1.  Right-sided chest tube in place with slight interval improvement  in right apical pneumothorax. Stable bibasilar atelectasis.    XR chest 1 view 02/15/2025  IMPRESSION:  1.  Postoperative bibasilar atelectasis/small effusions.  2. There is a small right apical pneumothorax. Correlate with any  concern for air leak.    IMPRESSION & PLAN:  POD # 5 s/p Mini Right Thoracotomy, Mitral Valve Repair 36 Simuplus Band   - Increase activity/ ambulation; PT/OT  - Encourage IS, C/DB; respiratory therapy; wean O2 as guilherme   - Cardiac rehab referral   - Continue cardiac meds: ASA, BB  -  1 right pleural in place to -20cm suction; morning CXR with right sided pneumothorax with chest tube in place with no air leaks on valsalva, will keep in for now and repeat CXR -> 216 Repeat CXR slight improvement in right PTX, plan to keep right CT one more day, repeat CXR in AM and try water seal for 4 hrs follow up by CXR  - 2/17 Right pleural CT removed today  - 2/17 2v CXR ordered for AM (2/18 will be done today)  - Postop echo done 2/17  - NO epicardial wires  - Tele until discharge  - Optimize nutrition and electrolytes    Acute post op pain  - Scheduled tylenol and PRN oxycodone  - PRN dilaudid while chest tube in place  - 2/17 stop IV dilaudid     NSR  - 2/15 increase metoprolol to 25mg BID today  - 2/17 Cont Metoprolol 25 mg bid   - Adjust medications as tolerated    Acute Blood Loss Anemia - stable  Recent Labs     02/17/25  0519 02/16/25  0611 02/15/25  0524 02/14/25  0323 02/13/25  1223 02/03/25  1538   HGB 12.9* 12.7* 13.3*  12.5* 12.4* 14.8   HCT 40.7* 39.2* 38.9* 37.1* 36.4* 46.7   - Cont MV  - DC iron today  - Daily labs, transfuse as indicated    Thrombocytopenia - resolved  Recent Labs     25  0519 25  0611 02/15/25  0524 25  0323 25  1223 25  1538    150 149* 136* 121* 174   - Etiology likely postop/CPB related  - Continue to trend with daily CBCs    Volume/Electrolyte Status: Preop wt Weight: 96.5 kg (212 lb 11.9 oz)   KEANU--> likely in setting of volume overload  Vitals:    25 0632   Weight: 91.8 kg (202 lb 4.8 oz)     - Weight: Pre-op 96.5 kg,  91.8 kg   - Adjust diuresis as needed for postop cardiac surgery hypervolemia  - Replete electrolytes for hypokalemia/hypomagnesemia/hypophosphatemia as needed;  replaced K  - Daily weights and strict I&Os  - Daily RFP while admitted  - 2/15 Lasix 40mg x2 today  -  Lasix 20 mg IV x1 dose today  -  Lasix 20 mg IV x1 dose -> Weaned 02 2L to RA  -  Patient is euvolemic on exam, no indication for diuresis today    Hypertension: home meds: Lisinopril 20 mg every day   Systolic (24hrs), Av , Min:109 , Max:129   - continue Metoprolol tartrate 25 mg bid   -  Patient is on lisinopril 20 mg every day outpatient, patient reported to significant cough on ACE-I, will start losartan 25 mg every day instead of his home ACE-I therapy   -  Tolerating losartan 12.5 mg every day, will cont at discharge  - additional antihypertensives as needed     VTE Prophylaxis: SCDs/TEDs, ambulation, SQ heparin  Code Status: Full Code    Dispo  - PT/OT recs  low intensity   - Would benefit from homecare for cardiac surgery carepath and RN visits  - Anticipate discharge today pending 2v CXR   - Will continue to assess discharge needs    DENISE Stewart-CNP  Cardiac Surgery [facility]  [facility]  Team Phone [phone]    2025  8:38 AM

## 2025-02-18 NOTE — CARE PLAN
The patient's goals for the shift include      Problem: Safety - Adult  Goal: Free from fall injury  Outcome: Progressing     Problem: Discharge Planning  Goal: Discharge to home or other facility with appropriate resources  Outcome: Progressing     Problem: Fall/Injury  Goal: Not fall by end of shift  Outcome: Progressing  Goal: Be free from injury by end of the shift  Outcome: Progressing  Goal: Verbalize understanding of personal risk factors for fall in the hospital  Outcome: Progressing  Goal: Verbalize understanding of risk factor reduction measures to prevent injury from fall in the home  Outcome: Progressing  Goal: Use assistive devices by end of the shift  Outcome: Progressing  Goal: Pace activities to prevent fatigue by end of the shift  Outcome: Progressing       The clinical goals for the shift include Pt will remain hemodynamically stable

## 2025-02-18 NOTE — TELEPHONE ENCOUNTER
Home Care received a referral for Nursing. Unfortunately, we are unable to accept and process the referral at this time.     Reason:  Patient's Home is Outside of [facility]     Patients, please reach out to the referring provider or your PCP to assist in obtaining an alternative home care agency and/or guidance to meet your needs.     Providers, please reach out to [facility] at [phone] with any questions regarding the declined referral.

## 2025-02-18 NOTE — PROGRESS NOTES
02/18/25 1231   Discharge Planning   Expected Discharge Disposition Home   ([facility])     [facility] was notified of patients discharge. Home care referral AVS, discharge summary were sent through [facility].

## 2025-02-18 NOTE — CARE PLAN
The patient's goals for the shift include  sleep     The clinical goals for the shift include pt will remain HDS throughout the shift

## 2025-02-18 NOTE — ANESTHESIA POSTPROCEDURE EVALUATION
Patient: Rigo Monet    Procedure Summary       Date: 02/13/25 Room / Location: [facility] OR 21 / [facility] OR    Anesthesia Start: 0714 Anesthesia Stop: 1212    Procedure: Mini Right Thoracotomy, Mitral Valve Repair 36 Simuplus Band, Right Femoral Arterial and Venous Cannulation, Intercostal Cryo Analgesia (Right) Diagnosis:       Mitral valve insufficiency, unspecified etiology      (Mitral valve insufficiency, unspecified etiology [I34.0])    Surgeons: Denilson Valdes MD Responsible Provider: Randall Rivers MD    Anesthesia Type: general ASA Status: 3            Anesthesia Type: general    Vitals Value Taken Time   /80 02/18/25 0632   Temp 36.6 °C (97.9 °F) 02/18/25 0632   Pulse 77 02/18/25 0632   Resp 18 02/18/25 0632   SpO2 93 % 02/18/25 0632       Anesthesia Post Evaluation    Patient location during evaluation: bedside  Patient participation: complete - patient cannot participate  Level of consciousness: sedated  Pain management: adequate  Multimodal analgesia pain management approach  Airway patency: patent  Two or more strategies used to mitigate risk of obstructive sleep apnea  Cardiovascular status: acceptable  Respiratory status: acceptable  Hydration status: acceptable  Postoperative Nausea and Vomiting: none  Comments: Delayed entry. This note reflects the patient's status after transfer of care from OR to ICU by anesthesia team. See RN flowsheet at time of arrival for vital signs.     No notable events documented.

## 2025-02-19 LAB
BODY SURFACE AREA: 2.1 M2
EJECTION FRACTION APICAL 4 CHAMBER: 76
EJECTION FRACTION: 63 %
LABORATORY COMMENT REPORT: NORMAL
LEFT ATRIUM VOLUME AREA LENGTH INDEX BSA: 26.7 ML/M2
LEFT VENTRICLE INTERNAL DIMENSION DIASTOLE: 4.77 CM (ref 3.5–6)
LEFT VENTRICULAR OUTFLOW TRACT DIAMETER: 2.2 CM
MITRAL VALVE E/A RATIO: 1.2
PATH REPORT.FINAL DX SPEC: NORMAL
PATH REPORT.GROSS SPEC: NORMAL
PATH REPORT.RELEVANT HX SPEC: NORMAL
PATH REPORT.TOTAL CANCER: NORMAL
RIGHT VENTRICLE FREE WALL PEAK S': 18 CM/S
TRICUSPID ANNULAR PLANE SYSTOLIC EXCURSION: 1.7 CM

## 2025-02-21 NOTE — PROGRESS NOTES
Contacting Rigo status post 2/13/25 mv repair surgery. Doing very well at home. Will plan follow up cxr to evaluate apical pneumothorax post discharge. Oxygen saturation at home 94%, gets short of breath when talking. Able to move 1500 ml's with spirometer. Weight today 201.8 lbs, bp 121/77, heart rate 76. Stated incisions intact. Asked him to schedule his post discharge echo. Confirmed Dr. Valdes appointment for April. Lindsay Jean RN

## 2025-02-22 DIAGNOSIS — Z98.890 S/P MVR (MITRAL VALVE REPAIR): ICD-10-CM

## 2025-02-24 ENCOUNTER — TELEMEDICINE CLINICAL SUPPORT (OUTPATIENT)
Dept: CARDIAC SURGERY | Facility: HOSPITAL | Age: 65
End: 2025-02-24
Payer: COMMERCIAL

## 2025-02-24 DIAGNOSIS — Z98.890 S/P MVR (MITRAL VALVE REPAIR): ICD-10-CM

## 2025-03-03 ENCOUNTER — APPOINTMENT (OUTPATIENT)
Dept: CARDIOLOGY | Facility: CLINIC | Age: 65
End: 2025-03-03
Payer: COMMERCIAL

## 2025-03-25 ENCOUNTER — APPOINTMENT (OUTPATIENT)
Dept: CARDIOLOGY | Facility: CLINIC | Age: 65
End: 2025-03-25
Payer: COMMERCIAL

## 2025-04-04 ENCOUNTER — APPOINTMENT (OUTPATIENT)
Dept: CARDIAC SURGERY | Facility: HOSPITAL | Age: 65
End: 2025-04-04
Payer: COMMERCIAL

## 2025-05-08 LAB — EJECTION FRACTION: 63 %

## (undated) DEVICE — Device

## (undated) DEVICE — MANIFOLD, 4 PORT NEPTUNE STANDARD

## (undated) DEVICE — PLEDGET, PTFE, SOFT, LARGE, 3/8 X 3/16 X 1/16 IN

## (undated) DEVICE — TAPE, POLYESTER, BRAIDED, PRE-CUT 2 X 30, WHITE"

## (undated) DEVICE — CLEANER, ELECTROSURGICAL, TIP, 5 X 5 CM, LF

## (undated) DEVICE — DRESSING, ISLAND, TELFA, 4 X 5 IN

## (undated) DEVICE — COUNTER, NEEDLE, FOAM BLOCK, POP-N-COUNT, W/BLADEGUARD, W/ADHESIVE 40 COUNT, RED

## (undated) DEVICE — BLADE, SAW STERNUM, STERILE

## (undated) DEVICE — CANNULA, CARDIAC SUMP

## (undated) DEVICE — CATHETER, URETHRAL, ROBNEL, 16 FR, 16 IN, LF, RED

## (undated) DEVICE — ELECTRODE, QUICK-COMBO, EDGE SYSTEM, REDI PACK

## (undated) DEVICE — KIT, VASCULAR ACCESS 210CM

## (undated) DEVICE — SUTURE, PROLENE, 4-0, 36 IN, BB, BLUE

## (undated) DEVICE — PACING CABLE, EXTENSION, 12 FT BEIGE, DISPOSABLE

## (undated) DEVICE — FILTER, IV, BLOOD, MICROAGGREGATE, 40 MIC, RBC TRANSFUSION

## (undated) DEVICE — TIP, SUCTION, YANKAUER, W/O VENT, FLEXIBLE, OPEN TIP, HIGH CAPACITY

## (undated) DEVICE — GOWN, ASTOUND, L

## (undated) DEVICE — CONNECTOR, STRAIGHT, 0.5 X 0.375 IN

## (undated) DEVICE — PROBE, CRYO-ABLATION MALLEABLE CRYOICE

## (undated) DEVICE — DRAPE, SHEET, CARDIOVASCULAR, ANTIMICROBIAL, W/ANESTHESIA SCREEN, IOBAN 2, STERI DRAPE, 107 X 133 IN, DISPOSABLE, FABRIC, BLUE, STERILE

## (undated) DEVICE — LOOP, VESSEL, MAXI, RED

## (undated) DEVICE — DRAPE, FLUID WARMER

## (undated) DEVICE — TUBING, SUCTION, CARDIAC, 6 FR

## (undated) DEVICE — PACING WIRE, 1/2 CIRCLE, 26MM NEEDLE, WHITE

## (undated) DEVICE — TRAY, SURESTEP, URINE METER, 14FR, SILICONE

## (undated) DEVICE — DRESSING, MEPILEX, BORDER, SACRUM, 8.7 X 9.8 IN

## (undated) DEVICE — KIT, CELL SAVER, W/COLLECTION SET, 225ML WASH SET

## (undated) DEVICE — ADAPTER, CARDIOPLEGIA, VENTING, Y, 19.1 CM

## (undated) DEVICE — SUTURE, ETHIBOND, XTRA, 30 IN, 0, CTX, GREEN

## (undated) DEVICE — CANNULA, BIOMEDICUS 25FR, FEMORAL VENOUS

## (undated) DEVICE — COLLECTION UNIT, DRAINAGE, THORACIC, SINGLE TUBE, DRY SUCTION, ATS COMPATIBLE, OASIS 3600, LF

## (undated) DEVICE — TUBING PACK, OXYGENATOR, ADULT

## (undated) DEVICE — SUTURE, PROLENE, 3-0, 48 IN, SH, DA, BLUE

## (undated) DEVICE — TUBE, SALEM SUMP, 16 FR X 48IN, ENFIT

## (undated) DEVICE — SUTURE, MONOCRYL, 3-0, 18 IN, PS2, UNDYED

## (undated) DEVICE — SUTURE, SILK, 2-0, 30 IN, SH, CONTROL RELEASE, MULTIPACK, BLACK

## (undated) DEVICE — CONNECTOR, STRAIGHT, 0.375 X 0.375 IN

## (undated) DEVICE — CATHETER, DRAINAGE, NASOGASTRIC, DOUBLE LUMEN, FUNNEL END, SUMP, SALEM, 18 FR, 48 IN, PVC, STERILE

## (undated) DEVICE — LOOP, VESSEL, MAXI, BLUE

## (undated) DEVICE — APPLICATOR, CHLORAPREP, W/ORANGE TINT, 26ML

## (undated) DEVICE — RETRACTOR SYSTEM, ALEXIS, MEDIUM

## (undated) DEVICE — KIT, TOURNIQUET, 7"

## (undated) DEVICE — SUTURE, VICRYL, 2-0, 27 IN, CT-1, VIOLET

## (undated) DEVICE — MARKER, SKIN, DUAL TIP INK W/9 LABEL AND REMOVABLE TIME OUT SLEEVE

## (undated) DEVICE — SHUNT, SENSOR

## (undated) DEVICE — KIT, MIS COR-KNOT COMBO

## (undated) DEVICE — CANNULA, AORTIC ROOT, LONG

## (undated) DEVICE — DRAPE, INSTRUMENT, W/POUCH, STERI DRAPE, 7 X 11 IN, DISPOSABLE, STERILE

## (undated) DEVICE — OXYGENATOR FX 25, W/HR, ARTERIAL FILTER

## (undated) DEVICE — ELECTRODE, ELECTROSURGICAL, BLADE, EXTENDED, 6.5 IN, STAINLESS STEEL

## (undated) DEVICE — PERFUSION PACK, HEMOCONCENTRATOR, 0.25 TUBING 36 IN LONG

## (undated) DEVICE — CLIPPER, SURGICAL BLADE ASSEMBLY, GENERAL PURPOSE, SINGLE USE

## (undated) DEVICE — CASSETTE, BLOOD, PLEGIC SET

## (undated) DEVICE — SUTURE, VICRYL, 4-0, 27 IN, KS, UNDYED

## (undated) DEVICE — SUTURE, ETHIBOND, 5, 30 IN, V40, MULTIPACK, GREEN

## (undated) DEVICE — DRESSING, MEPILEX, BORDER, HEEL, 8.7 X 9.1 IN

## (undated) DEVICE — DRAPE, CARDIOVASCULAR, SPLIT, 115 X 147 IN

## (undated) DEVICE — COVER, CART, 45 X 27 X 48 IN, CLEAR

## (undated) DEVICE — LOADS, COR-KNOT (6PK)

## (undated) DEVICE — FOOT SWITCH, PENCIL, W/HOLSTER, LONGER CORD

## (undated) DEVICE — CATHETER, THORACIC, STRAIGHT, ADULT, 28 FR, PVC

## (undated) DEVICE — SUTURE, PROLENE, 3-0, 36 IN, SH, DA, BLUE

## (undated) DEVICE — MICROCOAGULATION TEST, ACT+ TEST CUVETTE

## (undated) DEVICE — SUTURE, ETHIBOND, XTRA, 30 IN, 0, CT-1, GREEN

## (undated) DEVICE — DRESSING, ADHESIVE, ISLAND, TELFA, 2 X 3.75 IN, LF